# Patient Record
Sex: MALE | Race: WHITE
[De-identification: names, ages, dates, MRNs, and addresses within clinical notes are randomized per-mention and may not be internally consistent; named-entity substitution may affect disease eponyms.]

---
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
## 2019-06-26 ENCOUNTER — HOSPITAL ENCOUNTER (INPATIENT)
Dept: HOSPITAL 100 - ED | Age: 19
LOS: 3 days | Discharge: HOME | DRG: 638 | End: 2019-06-29
Payer: SELF-PAY

## 2019-06-26 VITALS — HEART RATE: 125 BPM

## 2019-06-26 VITALS
OXYGEN SATURATION: 100 % | DIASTOLIC BLOOD PRESSURE: 65 MMHG | HEART RATE: 129 BPM | SYSTOLIC BLOOD PRESSURE: 150 MMHG | RESPIRATION RATE: 30 BRPM

## 2019-06-26 VITALS
HEART RATE: 130 BPM | SYSTOLIC BLOOD PRESSURE: 167 MMHG | OXYGEN SATURATION: 100 % | RESPIRATION RATE: 23 BRPM | DIASTOLIC BLOOD PRESSURE: 87 MMHG

## 2019-06-26 VITALS
HEART RATE: 129 BPM | SYSTOLIC BLOOD PRESSURE: 156 MMHG | RESPIRATION RATE: 22 BRPM | DIASTOLIC BLOOD PRESSURE: 83 MMHG | OXYGEN SATURATION: 100 %

## 2019-06-26 VITALS
SYSTOLIC BLOOD PRESSURE: 157 MMHG | HEART RATE: 129 BPM | DIASTOLIC BLOOD PRESSURE: 74 MMHG | RESPIRATION RATE: 22 BRPM | OXYGEN SATURATION: 100 % | TEMPERATURE: 97.4 F

## 2019-06-26 VITALS
SYSTOLIC BLOOD PRESSURE: 138 MMHG | OXYGEN SATURATION: 99 % | DIASTOLIC BLOOD PRESSURE: 67 MMHG | HEART RATE: 130 BPM | RESPIRATION RATE: 30 BRPM

## 2019-06-26 VITALS
RESPIRATION RATE: 26 BRPM | OXYGEN SATURATION: 100 % | SYSTOLIC BLOOD PRESSURE: 159 MMHG | DIASTOLIC BLOOD PRESSURE: 83 MMHG | HEART RATE: 131 BPM

## 2019-06-26 VITALS
RESPIRATION RATE: 20 BRPM | OXYGEN SATURATION: 98 % | TEMPERATURE: 97.6 F | HEART RATE: 140 BPM | DIASTOLIC BLOOD PRESSURE: 83 MMHG | SYSTOLIC BLOOD PRESSURE: 144 MMHG

## 2019-06-26 VITALS — BODY MASS INDEX: 24.6 KG/M2 | BODY MASS INDEX: 24 KG/M2

## 2019-06-26 DIAGNOSIS — Z79.4: ICD-10-CM

## 2019-06-26 DIAGNOSIS — E87.5: ICD-10-CM

## 2019-06-26 DIAGNOSIS — E87.6: ICD-10-CM

## 2019-06-26 DIAGNOSIS — E87.1: ICD-10-CM

## 2019-06-26 DIAGNOSIS — E03.9: ICD-10-CM

## 2019-06-26 DIAGNOSIS — N17.9: ICD-10-CM

## 2019-06-26 DIAGNOSIS — E86.0: ICD-10-CM

## 2019-06-26 DIAGNOSIS — E10.10: Primary | ICD-10-CM

## 2019-06-26 LAB
ANION GAP: 24 (ref 5–15)
ANION GAP: 25 (ref 5–15)
BASE EXCESS BLDV CALC-SCNC: -21 MMOL/L (ref -1–3.5)
BUN SERPL-MCNC: 13 MG/DL (ref 7–18)
BUN SERPL-MCNC: 15 MG/DL (ref 7–18)
BUN/CREAT RATIO: 10.1 RATIO (ref 10–20)
BUN/CREAT RATIO: 10.3 RATIO (ref 10–20)
CALCIUM SERPL-MCNC: 8.1 MG/DL (ref 8.5–10.1)
CALCIUM SERPL-MCNC: 9.5 MG/DL (ref 8.5–10.1)
CARBON DIOXIDE: 7 MMOL/L (ref 21–32)
CARBON DIOXIDE: 9 MMOL/L (ref 21–32)
CHLORIDE: 106 MMOL/L (ref 98–107)
CHLORIDE: 99 MMOL/L (ref 98–107)
CO2 BLDA-SCNC: 8 MMOL/L (ref 23–33)
DEPRECATED RDW RBC: 42.7 FL (ref 35.1–43.9)
DIFFERENTIAL INDICATED: (no result)
ERYTHROCYTE [DISTWIDTH] IN BLOOD: 13.8 % (ref 11.6–14.6)
EST GLOM FILT RATE - AFR AMER: 80 ML/MIN (ref 60–?)
EST GLOM FILT RATE - AFR AMER: 91 ML/MIN (ref 60–?)
ESTIMATED CREATININE CLEARANCE: 112.14 ML/MIN
ESTIMATED CREATININE CLEARANCE: 99.77 ML/MIN
GLUCOSE, DIPSTICK: 1000 MG/DL
GLUCOSE: 514 MG/DL (ref 74–106)
GLUCOSE: 607 MG/DL (ref 74–106)
HCT VFR BLD AUTO: 45.2 % (ref 40–54)
HEMOGLOBIN: 16 G/DL (ref 13–16.5)
HGB BLD-MCNC: 16 G/DL (ref 13–16.5)
IMMATURE GRANULOCYTES COUNT: 0.12 X10^3/UL (ref 0–0)
KETONE-DIPSTICK: 150 MG/DL
MAGNESIUM: 1.9 MG/DL (ref 1.6–2.6)
MCV RBC: 86.3 FL (ref 80–94)
MEAN CORP HGB CONC: 35.4 G/GL (ref 32–36)
MEAN PLATELET VOL.: 10.2 FL (ref 6.2–12)
MUCOUS THREADS URNS QL MICRO: (no result) /HPF
PLATELET # BLD: 299 K/MM3 (ref 150–450)
PLATELET COUNT: 299 K/MM3 (ref 150–450)
PO2 BLDV: 60 MMHG (ref 25–40)
POSITIVE COUNT: NO
POSITIVE DIFFERENTIAL: NO
POSITIVE MORPHOLOGY: NO
POTASSIUM: 5.2 MMOL/L (ref 3.5–5.1)
POTASSIUM: 5.5 MMOL/L (ref 3.5–5.1)
PROT UR QL STRIP.AUTO: 15 MG/DL
RBC # BLD AUTO: 5.24 M/MM3 (ref 4.6–6.2)
RBC DISTRIBUTION WIDTH CV: 13.8 % (ref 11.6–14.6)
RBC DISTRIBUTION WIDTH SD: 42.7 FL (ref 35.1–43.9)
RBC UR QL: (no result) /HPF (ref 0–5)
RESULTS TO: (no result)
SITE: (no result)
SP GR UR: 1.02 (ref 1–1.03)
SQUAMOUS URNS QL MICRO: (no result) /HPF (ref 0–5)
URINE PRESERVATIVE: (no result)
VBG SO2: 83 % (ref 50–70)
WBC # BLD AUTO: 11.4 K/MM3 (ref 4.4–11)
WHITE BLOOD COUNT: 11.4 K/MM3 (ref 4.4–11)

## 2019-06-26 PROCEDURE — 85025 COMPLETE CBC W/AUTO DIFF WBC: CPT

## 2019-06-26 PROCEDURE — 82962 GLUCOSE BLOOD TEST: CPT

## 2019-06-26 PROCEDURE — 97802 MEDICAL NUTRITION INDIV IN: CPT

## 2019-06-26 PROCEDURE — 71045 X-RAY EXAM CHEST 1 VIEW: CPT

## 2019-06-26 PROCEDURE — A4216 STERILE WATER/SALINE, 10 ML: HCPCS

## 2019-06-26 PROCEDURE — 81001 URINALYSIS AUTO W/SCOPE: CPT

## 2019-06-26 PROCEDURE — 83735 ASSAY OF MAGNESIUM: CPT

## 2019-06-26 PROCEDURE — 83036 HEMOGLOBIN GLYCOSYLATED A1C: CPT

## 2019-06-26 PROCEDURE — 36415 COLL VENOUS BLD VENIPUNCTURE: CPT

## 2019-06-26 PROCEDURE — 93005 ELECTROCARDIOGRAM TRACING: CPT

## 2019-06-26 PROCEDURE — 82803 BLOOD GASES ANY COMBINATION: CPT

## 2019-06-26 PROCEDURE — 80048 BASIC METABOLIC PNL TOTAL CA: CPT

## 2019-06-26 PROCEDURE — 99285 EMERGENCY DEPT VISIT HI MDM: CPT

## 2019-06-26 PROCEDURE — 82009 KETONE BODYS QUAL: CPT

## 2019-06-26 PROCEDURE — 36600 WITHDRAWAL OF ARTERIAL BLOOD: CPT

## 2019-06-26 RX ADMIN — SODIUM CHLORIDE 500 ML: 9 INJECTION, SOLUTION INTRAVENOUS at 21:14

## 2019-06-26 RX ADMIN — SODIUM CHLORIDE 250 ML: 9 INJECTION, SOLUTION INTRAVENOUS at 23:11

## 2019-06-26 RX ADMIN — SODIUM CHLORIDE 999 ML: 9 INJECTION, SOLUTION INTRAVENOUS at 20:02

## 2019-06-27 VITALS
SYSTOLIC BLOOD PRESSURE: 148 MMHG | DIASTOLIC BLOOD PRESSURE: 74 MMHG | OXYGEN SATURATION: 98 % | RESPIRATION RATE: 22 BRPM | HEART RATE: 105 BPM

## 2019-06-27 VITALS
DIASTOLIC BLOOD PRESSURE: 80 MMHG | SYSTOLIC BLOOD PRESSURE: 150 MMHG | HEART RATE: 128 BPM | OXYGEN SATURATION: 100 % | RESPIRATION RATE: 22 BRPM

## 2019-06-27 VITALS
HEART RATE: 101 BPM | DIASTOLIC BLOOD PRESSURE: 82 MMHG | TEMPERATURE: 98.3 F | SYSTOLIC BLOOD PRESSURE: 148 MMHG | RESPIRATION RATE: 18 BRPM | OXYGEN SATURATION: 100 %

## 2019-06-27 VITALS
RESPIRATION RATE: 21 BRPM | HEART RATE: 128 BPM | DIASTOLIC BLOOD PRESSURE: 64 MMHG | OXYGEN SATURATION: 99 % | SYSTOLIC BLOOD PRESSURE: 144 MMHG

## 2019-06-27 VITALS
HEART RATE: 114 BPM | SYSTOLIC BLOOD PRESSURE: 136 MMHG | OXYGEN SATURATION: 100 % | RESPIRATION RATE: 17 BRPM | DIASTOLIC BLOOD PRESSURE: 62 MMHG

## 2019-06-27 VITALS
RESPIRATION RATE: 21 BRPM | OXYGEN SATURATION: 100 % | DIASTOLIC BLOOD PRESSURE: 65 MMHG | HEART RATE: 123 BPM | SYSTOLIC BLOOD PRESSURE: 139 MMHG

## 2019-06-27 VITALS
HEART RATE: 111 BPM | SYSTOLIC BLOOD PRESSURE: 137 MMHG | OXYGEN SATURATION: 99 % | TEMPERATURE: 98.2 F | RESPIRATION RATE: 21 BRPM | DIASTOLIC BLOOD PRESSURE: 68 MMHG

## 2019-06-27 VITALS
HEART RATE: 94 BPM | SYSTOLIC BLOOD PRESSURE: 129 MMHG | OXYGEN SATURATION: 100 % | RESPIRATION RATE: 20 BRPM | DIASTOLIC BLOOD PRESSURE: 74 MMHG | TEMPERATURE: 98.42 F

## 2019-06-27 VITALS
SYSTOLIC BLOOD PRESSURE: 139 MMHG | DIASTOLIC BLOOD PRESSURE: 84 MMHG | OXYGEN SATURATION: 100 % | RESPIRATION RATE: 20 BRPM | HEART RATE: 93 BPM

## 2019-06-27 VITALS
OXYGEN SATURATION: 100 % | RESPIRATION RATE: 21 BRPM | HEART RATE: 94 BPM | DIASTOLIC BLOOD PRESSURE: 91 MMHG | SYSTOLIC BLOOD PRESSURE: 144 MMHG

## 2019-06-27 VITALS
SYSTOLIC BLOOD PRESSURE: 132 MMHG | DIASTOLIC BLOOD PRESSURE: 82 MMHG | OXYGEN SATURATION: 100 % | TEMPERATURE: 98.6 F | HEART RATE: 98 BPM | RESPIRATION RATE: 18 BRPM

## 2019-06-27 VITALS
SYSTOLIC BLOOD PRESSURE: 137 MMHG | DIASTOLIC BLOOD PRESSURE: 67 MMHG | OXYGEN SATURATION: 98 % | HEART RATE: 104 BPM | RESPIRATION RATE: 20 BRPM

## 2019-06-27 VITALS
DIASTOLIC BLOOD PRESSURE: 71 MMHG | SYSTOLIC BLOOD PRESSURE: 127 MMHG | RESPIRATION RATE: 18 BRPM | OXYGEN SATURATION: 100 % | HEART RATE: 115 BPM

## 2019-06-27 VITALS
RESPIRATION RATE: 21 BRPM | OXYGEN SATURATION: 99 % | DIASTOLIC BLOOD PRESSURE: 56 MMHG | HEART RATE: 120 BPM | SYSTOLIC BLOOD PRESSURE: 139 MMHG

## 2019-06-27 VITALS — HEART RATE: 105 BPM

## 2019-06-27 VITALS
DIASTOLIC BLOOD PRESSURE: 84 MMHG | HEART RATE: 95 BPM | OXYGEN SATURATION: 95 % | RESPIRATION RATE: 18 BRPM | SYSTOLIC BLOOD PRESSURE: 139 MMHG

## 2019-06-27 VITALS
RESPIRATION RATE: 12 BRPM | DIASTOLIC BLOOD PRESSURE: 73 MMHG | SYSTOLIC BLOOD PRESSURE: 140 MMHG | TEMPERATURE: 98.24 F | OXYGEN SATURATION: 100 % | HEART RATE: 105 BPM

## 2019-06-27 VITALS
DIASTOLIC BLOOD PRESSURE: 76 MMHG | OXYGEN SATURATION: 99 % | HEART RATE: 129 BPM | SYSTOLIC BLOOD PRESSURE: 159 MMHG | RESPIRATION RATE: 23 BRPM | TEMPERATURE: 97.88 F

## 2019-06-27 VITALS
RESPIRATION RATE: 16 BRPM | SYSTOLIC BLOOD PRESSURE: 137 MMHG | HEART RATE: 91 BPM | TEMPERATURE: 98.42 F | OXYGEN SATURATION: 97 % | DIASTOLIC BLOOD PRESSURE: 81 MMHG

## 2019-06-27 VITALS — HEART RATE: 104 BPM

## 2019-06-27 LAB
ANION GAP: 10 (ref 5–15)
ANION GAP: 18 (ref 5–15)
ANION GAP: 18 (ref 5–15)
ANION GAP: 7 (ref 5–15)
BUN SERPL-MCNC: 11 MG/DL (ref 7–18)
BUN SERPL-MCNC: 6 MG/DL (ref 7–18)
BUN SERPL-MCNC: 7 MG/DL (ref 7–18)
BUN SERPL-MCNC: 9 MG/DL (ref 7–18)
BUN/CREAT RATIO: 5.5 RATIO (ref 10–20)
BUN/CREAT RATIO: 6.2 RATIO (ref 10–20)
BUN/CREAT RATIO: 7.4 RATIO (ref 10–20)
BUN/CREAT RATIO: 9.9 RATIO (ref 10–20)
CALCIUM SERPL-MCNC: 7.6 MG/DL (ref 8.5–10.1)
CALCIUM SERPL-MCNC: 7.8 MG/DL (ref 8.5–10.1)
CALCIUM SERPL-MCNC: 7.9 MG/DL (ref 8.5–10.1)
CALCIUM SERPL-MCNC: 8.3 MG/DL (ref 8.5–10.1)
CARBON DIOXIDE: 13 MMOL/L (ref 21–32)
CARBON DIOXIDE: 18 MMOL/L (ref 21–32)
CARBON DIOXIDE: 22 MMOL/L (ref 21–32)
CARBON DIOXIDE: 9 MMOL/L (ref 21–32)
CHLORIDE: 112 MMOL/L (ref 98–107)
CHLORIDE: 113 MMOL/L (ref 98–107)
DEPRECATED RDW RBC: 42.9 FL (ref 35.1–43.9)
DIFFERENTIAL INDICATED: (no result)
ERYTHROCYTE [DISTWIDTH] IN BLOOD: 14 % (ref 11.6–14.6)
EST GLOM FILT RATE - AFR AMER: 108 ML/MIN (ref 60–?)
EST GLOM FILT RATE - AFR AMER: 109 ML/MIN (ref 60–?)
EST GLOM FILT RATE - AFR AMER: 110 ML/MIN (ref 60–?)
EST GLOM FILT RATE - AFR AMER: 97 ML/MIN (ref 60–?)
ESTIMATED CREATININE CLEARANCE: 118.58 ML/MIN
ESTIMATED CREATININE CLEARANCE: 129.17 ML/MIN
ESTIMATED CREATININE CLEARANCE: 130.33 ML/MIN
ESTIMATED CREATININE CLEARANCE: 131.52 ML/MIN
GLUCOSE: 124 MG/DL (ref 74–106)
GLUCOSE: 181 MG/DL (ref 74–106)
GLUCOSE: 202 MG/DL (ref 74–106)
GLUCOSE: 222 MG/DL (ref 74–106)
HCT VFR BLD AUTO: 39.3 % (ref 40–54)
HEMOGLOBIN: 13.7 G/DL (ref 13–16.5)
HGB BLD-MCNC: 13.7 G/DL (ref 13–16.5)
IMMATURE GRANULOCYTES COUNT: 0.2 X10^3/UL (ref 0–0)
MCV RBC: 86.9 FL (ref 80–94)
MEAN CORP HGB CONC: 34.9 G/GL (ref 32–36)
MEAN PLATELET VOL.: 9.4 FL (ref 6.2–12)
PLATELET # BLD: 219 K/MM3 (ref 150–450)
PLATELET COUNT: 219 K/MM3 (ref 150–450)
POSITIVE COUNT: NO
POSITIVE DIFFERENTIAL: NO
POSITIVE MORPHOLOGY: NO
POTASSIUM: 3.4 MMOL/L (ref 3.5–5.1)
POTASSIUM: 3.5 MMOL/L (ref 3.5–5.1)
POTASSIUM: 4 MMOL/L (ref 3.5–5.1)
POTASSIUM: 4.7 MMOL/L (ref 3.5–5.1)
RBC # BLD AUTO: 4.52 M/MM3 (ref 4.6–6.2)
RBC DISTRIBUTION WIDTH CV: 14 % (ref 11.6–14.6)
RBC DISTRIBUTION WIDTH SD: 42.9 FL (ref 35.1–43.9)
WBC # BLD AUTO: 12.2 K/MM3 (ref 4.4–11)
WHITE BLOOD COUNT: 12.2 K/MM3 (ref 4.4–11)

## 2019-06-27 RX ADMIN — DEXTROSE AND SODIUM CHLORIDE 150 ML: 5; 450 INJECTION, SOLUTION INTRAVENOUS at 07:30

## 2019-06-27 RX ADMIN — DEXTROSE AND SODIUM CHLORIDE 150 ML: 5; 450 INJECTION, SOLUTION INTRAVENOUS at 00:58

## 2019-06-27 RX ADMIN — DEXTROSE AND SODIUM CHLORIDE 150 ML: 5; 450 INJECTION, SOLUTION INTRAVENOUS at 16:23

## 2019-06-27 RX ADMIN — SODIUM CHLORIDE, PRESERVATIVE FREE 0 ML: 5 INJECTION INTRAVENOUS at 05:57

## 2019-06-28 VITALS
OXYGEN SATURATION: 100 % | SYSTOLIC BLOOD PRESSURE: 156 MMHG | RESPIRATION RATE: 16 BRPM | DIASTOLIC BLOOD PRESSURE: 95 MMHG | HEART RATE: 113 BPM

## 2019-06-28 VITALS
DIASTOLIC BLOOD PRESSURE: 73 MMHG | OXYGEN SATURATION: 100 % | HEART RATE: 110 BPM | SYSTOLIC BLOOD PRESSURE: 137 MMHG | RESPIRATION RATE: 19 BRPM

## 2019-06-28 VITALS
TEMPERATURE: 98.8 F | HEART RATE: 65 BPM | RESPIRATION RATE: 13 BRPM | OXYGEN SATURATION: 94 % | DIASTOLIC BLOOD PRESSURE: 49 MMHG | SYSTOLIC BLOOD PRESSURE: 99 MMHG

## 2019-06-28 VITALS
RESPIRATION RATE: 21 BRPM | HEART RATE: 104 BPM | SYSTOLIC BLOOD PRESSURE: 151 MMHG | OXYGEN SATURATION: 100 % | DIASTOLIC BLOOD PRESSURE: 92 MMHG

## 2019-06-28 VITALS
RESPIRATION RATE: 16 BRPM | OXYGEN SATURATION: 100 % | DIASTOLIC BLOOD PRESSURE: 91 MMHG | HEART RATE: 99 BPM | SYSTOLIC BLOOD PRESSURE: 153 MMHG

## 2019-06-28 VITALS
OXYGEN SATURATION: 100 % | DIASTOLIC BLOOD PRESSURE: 93 MMHG | SYSTOLIC BLOOD PRESSURE: 152 MMHG | RESPIRATION RATE: 21 BRPM | HEART RATE: 109 BPM

## 2019-06-28 VITALS — HEART RATE: 135 BPM

## 2019-06-28 VITALS — HEART RATE: 146 BPM

## 2019-06-28 VITALS — DIASTOLIC BLOOD PRESSURE: 81 MMHG | SYSTOLIC BLOOD PRESSURE: 149 MMHG | HEART RATE: 114 BPM | RESPIRATION RATE: 21 BRPM

## 2019-06-28 VITALS
DIASTOLIC BLOOD PRESSURE: 79 MMHG | RESPIRATION RATE: 22 BRPM | OXYGEN SATURATION: 100 % | SYSTOLIC BLOOD PRESSURE: 141 MMHG | HEART RATE: 133 BPM

## 2019-06-28 VITALS
OXYGEN SATURATION: 100 % | DIASTOLIC BLOOD PRESSURE: 85 MMHG | SYSTOLIC BLOOD PRESSURE: 151 MMHG | HEART RATE: 110 BPM | RESPIRATION RATE: 24 BRPM | TEMPERATURE: 98.96 F

## 2019-06-28 VITALS — OXYGEN SATURATION: 100 % | HEART RATE: 148 BPM | RESPIRATION RATE: 27 BRPM

## 2019-06-28 VITALS
OXYGEN SATURATION: 100 % | SYSTOLIC BLOOD PRESSURE: 155 MMHG | HEART RATE: 123 BPM | DIASTOLIC BLOOD PRESSURE: 85 MMHG | RESPIRATION RATE: 18 BRPM

## 2019-06-28 VITALS
RESPIRATION RATE: 21 BRPM | DIASTOLIC BLOOD PRESSURE: 77 MMHG | SYSTOLIC BLOOD PRESSURE: 140 MMHG | OXYGEN SATURATION: 100 % | HEART RATE: 119 BPM

## 2019-06-28 VITALS
DIASTOLIC BLOOD PRESSURE: 89 MMHG | SYSTOLIC BLOOD PRESSURE: 151 MMHG | OXYGEN SATURATION: 100 % | RESPIRATION RATE: 25 BRPM | HEART RATE: 132 BPM

## 2019-06-28 VITALS
OXYGEN SATURATION: 100 % | RESPIRATION RATE: 27 BRPM | SYSTOLIC BLOOD PRESSURE: 155 MMHG | DIASTOLIC BLOOD PRESSURE: 79 MMHG | HEART RATE: 136 BPM

## 2019-06-28 VITALS
DIASTOLIC BLOOD PRESSURE: 80 MMHG | HEART RATE: 155 BPM | OXYGEN SATURATION: 100 % | SYSTOLIC BLOOD PRESSURE: 180 MMHG | RESPIRATION RATE: 23 BRPM

## 2019-06-28 VITALS — HEART RATE: 138 BPM

## 2019-06-28 VITALS
OXYGEN SATURATION: 100 % | RESPIRATION RATE: 31 BRPM | DIASTOLIC BLOOD PRESSURE: 54 MMHG | SYSTOLIC BLOOD PRESSURE: 156 MMHG | HEART RATE: 148 BPM

## 2019-06-28 VITALS
HEART RATE: 130 BPM | OXYGEN SATURATION: 100 % | SYSTOLIC BLOOD PRESSURE: 167 MMHG | DIASTOLIC BLOOD PRESSURE: 88 MMHG | RESPIRATION RATE: 23 BRPM

## 2019-06-28 VITALS
DIASTOLIC BLOOD PRESSURE: 72 MMHG | RESPIRATION RATE: 20 BRPM | OXYGEN SATURATION: 100 % | HEART RATE: 128 BPM | TEMPERATURE: 98.24 F | SYSTOLIC BLOOD PRESSURE: 138 MMHG

## 2019-06-28 VITALS — HEART RATE: 102 BPM

## 2019-06-28 VITALS
SYSTOLIC BLOOD PRESSURE: 157 MMHG | HEART RATE: 125 BPM | OXYGEN SATURATION: 100 % | RESPIRATION RATE: 25 BRPM | DIASTOLIC BLOOD PRESSURE: 90 MMHG

## 2019-06-28 LAB
ANION GAP: 11 (ref 5–15)
ANION GAP: 14 (ref 5–15)
ANION GAP: 19 (ref 5–15)
ANION GAP: 24 (ref 5–15)
ANION GAP: 26 (ref 5–15)
BASE EXCESS BLDV CALC-SCNC: -25 MMOL/L (ref -1–3.5)
BUN SERPL-MCNC: 10 MG/DL (ref 7–18)
BUN SERPL-MCNC: 6 MG/DL (ref 7–18)
BUN SERPL-MCNC: 7 MG/DL (ref 7–18)
BUN SERPL-MCNC: 8 MG/DL (ref 7–18)
BUN SERPL-MCNC: 9 MG/DL (ref 7–18)
BUN/CREAT RATIO: 5.3 RATIO (ref 10–20)
BUN/CREAT RATIO: 6.1 RATIO (ref 10–20)
BUN/CREAT RATIO: 6.2 RATIO (ref 10–20)
BUN/CREAT RATIO: 7.1 RATIO (ref 10–20)
BUN/CREAT RATIO: 7.3 RATIO (ref 10–20)
CALCIUM SERPL-MCNC: 7.9 MG/DL (ref 8.5–10.1)
CALCIUM SERPL-MCNC: 7.9 MG/DL (ref 8.5–10.1)
CALCIUM SERPL-MCNC: 8.3 MG/DL (ref 8.5–10.1)
CALCIUM SERPL-MCNC: 8.7 MG/DL (ref 8.5–10.1)
CALCIUM SERPL-MCNC: 9.1 MG/DL (ref 8.5–10.1)
CARBON DIOXIDE: 11 MMOL/L (ref 21–32)
CARBON DIOXIDE: 16 MMOL/L (ref 21–32)
CARBON DIOXIDE: 6 MMOL/L (ref 21–32)
CARBON DIOXIDE: 6 MMOL/L (ref 21–32)
CARBON DIOXIDE: 8 MMOL/L (ref 21–32)
CHLORIDE: 104 MMOL/L (ref 98–107)
CHLORIDE: 107 MMOL/L (ref 98–107)
CHLORIDE: 116 MMOL/L (ref 98–107)
CHLORIDE: 117 MMOL/L (ref 98–107)
CHLORIDE: 118 MMOL/L (ref 98–107)
CO2 BLDA-SCNC: 6 MMOL/L (ref 23–33)
EST GLOM FILT RATE - AFR AMER: 104 ML/MIN (ref 60–?)
EST GLOM FILT RATE - AFR AMER: 105 ML/MIN (ref 60–?)
EST GLOM FILT RATE - AFR AMER: 85 ML/MIN (ref 60–?)
EST GLOM FILT RATE - AFR AMER: 91 ML/MIN (ref 60–?)
EST GLOM FILT RATE - AFR AMER: 94 ML/MIN (ref 60–?)
ESTIMATED CREATININE CLEARANCE: 105.6 ML/MIN
ESTIMATED CREATININE CLEARANCE: 112.14 ML/MIN
ESTIMATED CREATININE CLEARANCE: 114.81 ML/MIN
GLUCOSE: 151 MG/DL (ref 74–106)
GLUCOSE: 180 MG/DL (ref 74–106)
GLUCOSE: 275 MG/DL (ref 74–106)
GLUCOSE: 444 MG/DL (ref 74–106)
GLUCOSE: 553 MG/DL (ref 74–106)
PO2 BLDV: 194 MMHG (ref 25–40)
POTASSIUM: 3.7 MMOL/L (ref 3.5–5.1)
POTASSIUM: 4.1 MMOL/L (ref 3.5–5.1)
POTASSIUM: 4.5 MMOL/L (ref 3.5–5.1)
POTASSIUM: 5 MMOL/L (ref 3.5–5.1)
POTASSIUM: 5 MMOL/L (ref 3.5–5.1)
TIME GIVEN: 1130
VBG SO2: 99 % (ref 50–70)

## 2019-06-28 RX ADMIN — PROMETHAZINE HYDROCHLORIDE 6.25 MG: 25 INJECTION INTRAMUSCULAR; INTRAVENOUS at 05:00

## 2019-06-28 RX ADMIN — DEXTROSE AND SODIUM CHLORIDE 150 ML: 5; 450 INJECTION, SOLUTION INTRAVENOUS at 18:30

## 2019-06-28 RX ADMIN — SODIUM CHLORIDE, PRESERVATIVE FREE 0 ML: 5 INJECTION INTRAVENOUS at 19:42

## 2019-06-28 RX ADMIN — DEXTROSE AND SODIUM CHLORIDE 150 ML: 5; 450 INJECTION, SOLUTION INTRAVENOUS at 12:13

## 2019-06-28 RX ADMIN — SODIUM CHLORIDE 999 ML: 9 INJECTION, SOLUTION INTRAVENOUS at 11:12

## 2019-06-28 RX ADMIN — SODIUM CHLORIDE 999 ML: 9 INJECTION, SOLUTION INTRAVENOUS at 08:58

## 2019-06-28 RX ADMIN — SODIUM CHLORIDE 999 ML: 9 INJECTION, SOLUTION INTRAVENOUS at 07:44

## 2019-06-29 VITALS
RESPIRATION RATE: 21 BRPM | SYSTOLIC BLOOD PRESSURE: 151 MMHG | OXYGEN SATURATION: 100 % | DIASTOLIC BLOOD PRESSURE: 95 MMHG | HEART RATE: 95 BPM

## 2019-06-29 VITALS
SYSTOLIC BLOOD PRESSURE: 138 MMHG | HEART RATE: 91 BPM | RESPIRATION RATE: 20 BRPM | DIASTOLIC BLOOD PRESSURE: 83 MMHG | OXYGEN SATURATION: 98 %

## 2019-06-29 VITALS
SYSTOLIC BLOOD PRESSURE: 140 MMHG | DIASTOLIC BLOOD PRESSURE: 79 MMHG | OXYGEN SATURATION: 98 % | HEART RATE: 108 BPM | RESPIRATION RATE: 18 BRPM

## 2019-06-29 VITALS
SYSTOLIC BLOOD PRESSURE: 154 MMHG | RESPIRATION RATE: 14 BRPM | DIASTOLIC BLOOD PRESSURE: 95 MMHG | TEMPERATURE: 98.1 F | OXYGEN SATURATION: 100 % | HEART RATE: 109 BPM

## 2019-06-29 VITALS
DIASTOLIC BLOOD PRESSURE: 91 MMHG | OXYGEN SATURATION: 99 % | RESPIRATION RATE: 17 BRPM | HEART RATE: 105 BPM | SYSTOLIC BLOOD PRESSURE: 143 MMHG | TEMPERATURE: 98.8 F

## 2019-06-29 VITALS — HEART RATE: 94 BPM

## 2019-06-29 VITALS
OXYGEN SATURATION: 99 % | TEMPERATURE: 97.8 F | SYSTOLIC BLOOD PRESSURE: 134 MMHG | HEART RATE: 92 BPM | RESPIRATION RATE: 20 BRPM | DIASTOLIC BLOOD PRESSURE: 81 MMHG

## 2019-06-29 VITALS
SYSTOLIC BLOOD PRESSURE: 117 MMHG | RESPIRATION RATE: 18 BRPM | OXYGEN SATURATION: 99 % | TEMPERATURE: 97.88 F | HEART RATE: 92 BPM | DIASTOLIC BLOOD PRESSURE: 67 MMHG

## 2019-06-29 VITALS
SYSTOLIC BLOOD PRESSURE: 134 MMHG | HEART RATE: 92 BPM | RESPIRATION RATE: 20 BRPM | OXYGEN SATURATION: 99 % | DIASTOLIC BLOOD PRESSURE: 81 MMHG

## 2019-06-29 VITALS
DIASTOLIC BLOOD PRESSURE: 82 MMHG | RESPIRATION RATE: 23 BRPM | OXYGEN SATURATION: 97 % | HEART RATE: 97 BPM | SYSTOLIC BLOOD PRESSURE: 143 MMHG

## 2019-06-29 VITALS
RESPIRATION RATE: 21 BRPM | DIASTOLIC BLOOD PRESSURE: 77 MMHG | OXYGEN SATURATION: 98 % | HEART RATE: 95 BPM | SYSTOLIC BLOOD PRESSURE: 153 MMHG

## 2019-06-29 LAB
ANION GAP: 12 (ref 5–15)
BUN SERPL-MCNC: 8 MG/DL (ref 7–18)
BUN/CREAT RATIO: 7.7 RATIO (ref 10–20)
CALCIUM SERPL-MCNC: 8.6 MG/DL (ref 8.5–10.1)
CARBON DIOXIDE: 20 MMOL/L (ref 21–32)
CHLORIDE: 112 MMOL/L (ref 98–107)
EST GLOM FILT RATE - AFR AMER: 117 ML/MIN (ref 60–?)
GLUCOSE: 211 MG/DL (ref 74–106)
POTASSIUM: 3.4 MMOL/L (ref 3.5–5.1)

## 2020-03-25 ENCOUNTER — HOSPITAL ENCOUNTER (OUTPATIENT)
Age: 20
End: 2020-03-25
Payer: COMMERCIAL

## 2020-03-25 VITALS — BODY MASS INDEX: 29.1 KG/M2

## 2020-03-25 DIAGNOSIS — E06.3: ICD-10-CM

## 2020-03-25 DIAGNOSIS — E03.8: ICD-10-CM

## 2020-03-25 DIAGNOSIS — E10.9: Primary | ICD-10-CM

## 2020-03-25 LAB
ALANINE AMINOTRANSFER ALT/SGPT: 30 U/L (ref 16–61)
ALBUMIN SERPL-MCNC: 4.5 G/DL (ref 3.2–5)
ALKALINE PHOSPHATASE: 87 U/L (ref 45–117)
ANION GAP: 8 (ref 5–15)
AST(SGOT): 17 U/L (ref 15–37)
BUN SERPL-MCNC: 17 MG/DL (ref 7–18)
BUN/CREAT RATIO: 21.8 RATIO (ref 10–20)
CALCIUM SERPL-MCNC: 9.5 MG/DL (ref 8.5–10.1)
CARBON DIOXIDE: 28 MMOL/L (ref 21–32)
CHLORIDE: 103 MMOL/L (ref 98–107)
CHOLEST SERPL-MCNC: 153 MG/DL
CREAT UR-MCNC: 75.3 MG/DL
EST GLOM FILT RATE - AFR AMER: 162 ML/MIN (ref 60–?)
GLOBULIN: 4.1 G/DL (ref 2.2–4.2)
GLUCOSE: 59 MG/DL (ref 74–106)
MICROALBUMIN UR-MCNC: 87.1 MG/L
POTASSIUM: 4.3 MMOL/L (ref 3.5–5.1)
TRIGLYCERIDES: 54 MG/DL
VLDLC SERPL-MCNC: 11 MG/DL (ref 5–40)

## 2020-03-25 PROCEDURE — 80053 COMPREHEN METABOLIC PANEL: CPT

## 2020-03-25 PROCEDURE — 80061 LIPID PANEL: CPT

## 2020-03-25 PROCEDURE — 84443 ASSAY THYROID STIM HORMONE: CPT

## 2020-03-25 PROCEDURE — 82570 ASSAY OF URINE CREATININE: CPT

## 2020-03-25 PROCEDURE — 82043 UR ALBUMIN QUANTITATIVE: CPT

## 2020-03-25 PROCEDURE — 36415 COLL VENOUS BLD VENIPUNCTURE: CPT

## 2020-04-08 ENCOUNTER — HOSPITAL ENCOUNTER (EMERGENCY)
Age: 20
Discharge: HOME | End: 2020-04-08
Payer: MEDICAID

## 2020-04-08 VITALS
RESPIRATION RATE: 17 BRPM | SYSTOLIC BLOOD PRESSURE: 155 MMHG | DIASTOLIC BLOOD PRESSURE: 86 MMHG | TEMPERATURE: 100.58 F | OXYGEN SATURATION: 99 % | HEART RATE: 117 BPM

## 2020-04-08 VITALS
RESPIRATION RATE: 15 BRPM | DIASTOLIC BLOOD PRESSURE: 71 MMHG | OXYGEN SATURATION: 99 % | HEART RATE: 131 BPM | SYSTOLIC BLOOD PRESSURE: 126 MMHG | TEMPERATURE: 99.8 F

## 2020-04-08 VITALS — BODY MASS INDEX: 24 KG/M2 | BODY MASS INDEX: 29 KG/M2

## 2020-04-08 VITALS
RESPIRATION RATE: 24 BRPM | SYSTOLIC BLOOD PRESSURE: 157 MMHG | OXYGEN SATURATION: 95 % | DIASTOLIC BLOOD PRESSURE: 89 MMHG | TEMPERATURE: 102.02 F | HEART RATE: 123 BPM

## 2020-04-08 DIAGNOSIS — Z79.4: ICD-10-CM

## 2020-04-08 DIAGNOSIS — J06.9: Primary | ICD-10-CM

## 2020-04-08 DIAGNOSIS — E10.9: ICD-10-CM

## 2020-04-08 DIAGNOSIS — E06.3: ICD-10-CM

## 2020-04-08 PROCEDURE — 96372 THER/PROPH/DIAG INJ SC/IM: CPT

## 2020-04-08 PROCEDURE — 87807 RSV ASSAY W/OPTIC: CPT

## 2020-04-08 PROCEDURE — 99284 EMERGENCY DEPT VISIT MOD MDM: CPT

## 2020-04-08 PROCEDURE — 87804 INFLUENZA ASSAY W/OPTIC: CPT

## 2020-04-08 PROCEDURE — 71045 X-RAY EXAM CHEST 1 VIEW: CPT

## 2021-09-23 ENCOUNTER — HOSPITAL ENCOUNTER (OUTPATIENT)
Age: 21
End: 2021-09-23
Payer: MEDICAID

## 2021-09-23 DIAGNOSIS — E10.9: Primary | ICD-10-CM

## 2021-09-23 DIAGNOSIS — E06.3: ICD-10-CM

## 2021-09-23 DIAGNOSIS — E03.8: ICD-10-CM

## 2021-09-23 LAB
ALANINE AMINOTRANSFER ALT/SGPT: 33 U/L (ref 16–61)
ALBUMIN SERPL-MCNC: 3.7 G/DL (ref 3.2–5)
ALKALINE PHOSPHATASE: 89 U/L (ref 45–117)
ANION GAP: 5 (ref 5–15)
AST(SGOT): 16 U/L (ref 15–37)
BUN SERPL-MCNC: 21 MG/DL (ref 7–18)
BUN/CREAT RATIO: 24.9 RATIO (ref 10–20)
CALCIUM SERPL-MCNC: 8.9 MG/DL (ref 8.5–10.1)
CARBON DIOXIDE: 27 MMOL/L (ref 21–32)
CHLORIDE: 107 MMOL/L (ref 98–107)
CHOLEST SERPL-MCNC: 167 MG/DL
CREAT UR-MCNC: 59.8 MG/DL
EST GLOM FILT RATE - AFR AMER: 146 ML/MIN (ref 60–?)
GLOBULIN: 3.4 G/DL (ref 2.2–4.2)
GLUCOSE: 303 MG/DL (ref 74–106)
MICROALBUMIN UR-MCNC: 12.1 MG/L
POTASSIUM: 4.3 MMOL/L (ref 3.5–5.1)
TRIGLYCERIDES: 83 MG/DL
VLDLC SERPL-MCNC: 17 MG/DL (ref 5–40)

## 2021-09-23 PROCEDURE — 80061 LIPID PANEL: CPT

## 2021-09-23 PROCEDURE — 80053 COMPREHEN METABOLIC PANEL: CPT

## 2021-09-23 PROCEDURE — 36415 COLL VENOUS BLD VENIPUNCTURE: CPT

## 2021-09-23 PROCEDURE — 84439 ASSAY OF FREE THYROXINE: CPT

## 2021-09-23 PROCEDURE — 82570 ASSAY OF URINE CREATININE: CPT

## 2021-09-23 PROCEDURE — 84443 ASSAY THYROID STIM HORMONE: CPT

## 2021-09-23 PROCEDURE — 82043 UR ALBUMIN QUANTITATIVE: CPT

## 2022-10-26 ENCOUNTER — HOSPITAL ENCOUNTER (OUTPATIENT)
Dept: HOSPITAL 100 - LABSPEC | Age: 22
Discharge: HOME | End: 2022-10-26
Payer: COMMERCIAL

## 2022-10-26 DIAGNOSIS — E06.3: ICD-10-CM

## 2022-10-26 DIAGNOSIS — E03.8: ICD-10-CM

## 2022-10-26 DIAGNOSIS — E10.9: Primary | ICD-10-CM

## 2022-10-26 LAB
ALANINE AMINOTRANSFER ALT/SGPT: 55 U/L (ref 16–61)
ALBUMIN SERPL-MCNC: 3.5 G/DL (ref 3.2–5)
ALKALINE PHOSPHATASE: 95 U/L (ref 45–117)
ANION GAP: 4 (ref 5–15)
AST(SGOT): 36 U/L (ref 15–37)
BUN SERPL-MCNC: 21 MG/DL (ref 7–18)
BUN/CREAT RATIO: 20.8 RATIO (ref 10–20)
CALCIUM SERPL-MCNC: 8.6 MG/DL (ref 8.5–10.1)
CARBON DIOXIDE: 28 MMOL/L (ref 21–32)
CHLORIDE: 104 MMOL/L (ref 98–107)
CHOLEST SERPL-MCNC: 139 MG/DL
CREAT UR-MCNC: 41 MG/DL
EST GLOM FILT RATE - AFR AMER: 117 ML/MIN (ref 60–?)
GLOBULIN: 3.9 G/DL (ref 2.2–4.2)
GLUCOSE: 355 MG/DL (ref 74–106)
MICROALBUMIN UR-MCNC: 7.8 MG/L
POTASSIUM: 4 MMOL/L (ref 3.5–5.1)
TRIGLYCERIDES: 108 MG/DL
VLDLC SERPL-MCNC: 22 MG/DL (ref 5–40)

## 2022-10-26 PROCEDURE — 84439 ASSAY OF FREE THYROXINE: CPT

## 2022-10-26 PROCEDURE — 80061 LIPID PANEL: CPT

## 2022-10-26 PROCEDURE — 84443 ASSAY THYROID STIM HORMONE: CPT

## 2022-10-26 PROCEDURE — 80053 COMPREHEN METABOLIC PANEL: CPT

## 2022-10-26 PROCEDURE — 36415 COLL VENOUS BLD VENIPUNCTURE: CPT

## 2022-10-26 PROCEDURE — 82043 UR ALBUMIN QUANTITATIVE: CPT

## 2022-10-26 PROCEDURE — 82570 ASSAY OF URINE CREATININE: CPT

## 2023-10-20 ENCOUNTER — DOCUMENTATION (OUTPATIENT)
Dept: PHYSICAL THERAPY | Facility: CLINIC | Age: 23
End: 2023-10-20
Payer: COMMERCIAL

## 2023-10-20 NOTE — PROGRESS NOTES
Physical Therapy    Discharge Summary    Name: Buzz Chatman  MRN: [id number]  : 2000  Date: 10/20/23    Discharge Summary: PT       Has been seen in clinical physical therapy on   to 23 for 4 visit (s); there has been no further visits attended. DC from PT

## 2023-12-25 ENCOUNTER — HOSPITAL ENCOUNTER (EMERGENCY)
Age: 23
Discharge: HOME | End: 2023-12-25
Payer: COMMERCIAL

## 2023-12-25 VITALS
RESPIRATION RATE: 16 BRPM | OXYGEN SATURATION: 98 % | HEART RATE: 90 BPM | DIASTOLIC BLOOD PRESSURE: 71 MMHG | SYSTOLIC BLOOD PRESSURE: 127 MMHG

## 2023-12-25 VITALS
RESPIRATION RATE: 19 BRPM | TEMPERATURE: 98.42 F | SYSTOLIC BLOOD PRESSURE: 129 MMHG | OXYGEN SATURATION: 97 % | DIASTOLIC BLOOD PRESSURE: 77 MMHG | HEART RATE: 89 BPM

## 2023-12-25 VITALS
HEART RATE: 93 BPM | BODY MASS INDEX: 31.6 KG/M2 | RESPIRATION RATE: 16 BRPM | DIASTOLIC BLOOD PRESSURE: 93 MMHG | TEMPERATURE: 96.44 F | OXYGEN SATURATION: 98 % | SYSTOLIC BLOOD PRESSURE: 149 MMHG

## 2023-12-25 VITALS — HEART RATE: 87 BPM | OXYGEN SATURATION: 97 % | RESPIRATION RATE: 19 BRPM

## 2023-12-25 DIAGNOSIS — E10.9: ICD-10-CM

## 2023-12-25 DIAGNOSIS — E06.3: ICD-10-CM

## 2023-12-25 DIAGNOSIS — E66.9: ICD-10-CM

## 2023-12-25 DIAGNOSIS — Z87.891: ICD-10-CM

## 2023-12-25 DIAGNOSIS — Z96.41: ICD-10-CM

## 2023-12-25 DIAGNOSIS — R10.84: Primary | ICD-10-CM

## 2023-12-25 DIAGNOSIS — R16.2: ICD-10-CM

## 2023-12-25 LAB
ALANINE AMINOTRANSFER ALT/SGPT: 28 U/L (ref 16–61)
ALBUMIN SERPL-MCNC: 3.9 G/DL (ref 3.2–5)
ALKALINE PHOSPHATASE: 79 U/L (ref 45–117)
ANION GAP: 4 (ref 5–15)
AST(SGOT): 13 U/L (ref 15–37)
BUN SERPL-MCNC: 20 MG/DL (ref 7–18)
BUN/CREAT RATIO: 22.8 RATIO (ref 10–20)
CALCIUM SERPL-MCNC: 9.2 MG/DL (ref 8.5–10.1)
CARBON DIOXIDE: 30 MMOL/L (ref 21–32)
CHLORIDE: 106 MMOL/L (ref 98–107)
DEPRECATED RDW RBC: 39.8 FL (ref 35.1–43.9)
ERYTHROCYTE [DISTWIDTH] IN BLOOD: 12.7 % (ref 11.6–14.6)
EST GLOM FILT RATE - AFR AMER: 137 ML/MIN (ref 60–?)
ESTIMATED CREATININE CLEARANCE: 158.91 ML/MIN
GLOBULIN: 3.8 G/DL (ref 2.2–4.2)
GLUCOSE: 146 MG/DL (ref 74–106)
HCT VFR BLD AUTO: 48.8 % (ref 40–54)
HEMOGLOBIN: 16.3 G/DL (ref 13–16.5)
HGB BLD-MCNC: 16.3 G/DL (ref 13–16.5)
IMMATURE GRANULOCYTES COUNT: 0.05 X10^3/UL (ref 0–0)
LIPASE: 12 U/L (ref 13–75)
MCV RBC: 86.8 FL (ref 80–94)
MEAN CORP HGB CONC: 33.4 G/DL (ref 32–36)
MEAN PLATELET VOL.: 9.4 FL (ref 6.2–12)
NRBC FLAGGED BY ANALYZER: 0 % (ref 0–5)
PLATELET # BLD: 213 K/MM3 (ref 150–450)
PLATELET COUNT: 213 K/MM3 (ref 150–450)
POTASSIUM: 3.9 MMOL/L (ref 3.5–5.1)
RBC # BLD AUTO: 5.62 M/MM3 (ref 4.6–6.2)
RBC DISTRIBUTION WIDTH CV: 12.7 % (ref 11.6–14.6)
RBC DISTRIBUTION WIDTH SD: 39.8 FL (ref 35.1–43.9)
WBC # BLD AUTO: 8 K/MM3 (ref 4.4–11)
WHITE BLOOD COUNT: 8 K/MM3 (ref 4.4–11)

## 2023-12-25 PROCEDURE — 74177 CT ABD & PELVIS W/CONTRAST: CPT

## 2023-12-25 PROCEDURE — 96375 TX/PRO/DX INJ NEW DRUG ADDON: CPT

## 2023-12-25 PROCEDURE — 80053 COMPREHEN METABOLIC PANEL: CPT

## 2023-12-25 PROCEDURE — 96365 THER/PROPH/DIAG IV INF INIT: CPT

## 2023-12-25 PROCEDURE — 85025 COMPLETE CBC W/AUTO DIFF WBC: CPT

## 2023-12-25 PROCEDURE — 83605 ASSAY OF LACTIC ACID: CPT

## 2023-12-25 PROCEDURE — 83690 ASSAY OF LIPASE: CPT

## 2023-12-25 PROCEDURE — 99284 EMERGENCY DEPT VISIT MOD MDM: CPT

## 2023-12-25 PROCEDURE — A4216 STERILE WATER/SALINE, 10 ML: HCPCS

## 2023-12-25 PROCEDURE — 87040 BLOOD CULTURE FOR BACTERIA: CPT

## 2024-01-04 ENCOUNTER — OFFICE VISIT (OUTPATIENT)
Dept: PRIMARY CARE | Facility: CLINIC | Age: 24
End: 2024-01-04
Payer: COMMERCIAL

## 2024-01-04 ENCOUNTER — LAB (OUTPATIENT)
Dept: LAB | Facility: LAB | Age: 24
End: 2024-01-04
Payer: COMMERCIAL

## 2024-01-04 VITALS
OXYGEN SATURATION: 96 % | WEIGHT: 261.3 LBS | SYSTOLIC BLOOD PRESSURE: 130 MMHG | BODY MASS INDEX: 31.82 KG/M2 | DIASTOLIC BLOOD PRESSURE: 70 MMHG | HEART RATE: 71 BPM

## 2024-01-04 DIAGNOSIS — R16.1 SPLENOMEGALY: ICD-10-CM

## 2024-01-04 DIAGNOSIS — J35.1 TONSILLAR HYPERTROPHY: ICD-10-CM

## 2024-01-04 DIAGNOSIS — R53.83 OTHER FATIGUE: ICD-10-CM

## 2024-01-04 DIAGNOSIS — R19.7 DIARRHEA, UNSPECIFIED TYPE: ICD-10-CM

## 2024-01-04 DIAGNOSIS — R16.0 HEPATOMEGALY: Primary | ICD-10-CM

## 2024-01-04 DIAGNOSIS — R16.0 HEPATOMEGALY: ICD-10-CM

## 2024-01-04 DIAGNOSIS — R06.83 SNORING: ICD-10-CM

## 2024-01-04 DIAGNOSIS — E01.0 THYROMEGALY: ICD-10-CM

## 2024-01-04 DIAGNOSIS — E10.69 TYPE 1 DIABETES MELLITUS WITH OTHER SPECIFIED COMPLICATION (MULTI): ICD-10-CM

## 2024-01-04 PROBLEM — E10.9 TYPE 1 DIABETES MELLITUS (MULTI): Status: ACTIVE | Noted: 2023-11-13

## 2024-01-04 PROBLEM — E11.10 DIABETIC KETOACIDOSIS (MULTI): Status: RESOLVED | Noted: 2024-01-04 | Resolved: 2024-01-04

## 2024-01-04 PROBLEM — Z96.41 PRESENCE OF INSULIN PUMP: Status: ACTIVE | Noted: 2024-01-04

## 2024-01-04 PROBLEM — S37.009A INJURY OF KIDNEY: Status: RESOLVED | Noted: 2024-01-04 | Resolved: 2024-01-04

## 2024-01-04 PROBLEM — Z91.199 GENERAL PATIENT NONCOMPLIANCE: Status: ACTIVE | Noted: 2024-01-04

## 2024-01-04 PROBLEM — S82.832A CLOSED FRACTURE OF LEFT DISTAL FIBULA: Status: RESOLVED | Noted: 2018-08-28 | Resolved: 2024-01-04

## 2024-01-04 PROBLEM — E66.9 OBESITY: Status: ACTIVE | Noted: 2024-01-04

## 2024-01-04 PROBLEM — F32.A DEPRESSIVE DISORDER: Status: ACTIVE | Noted: 2024-01-04

## 2024-01-04 LAB
ALT SERPL W P-5'-P-CCNC: 15 U/L (ref 10–52)
AST SERPL W P-5'-P-CCNC: 11 U/L (ref 9–39)
BASOPHILS # BLD AUTO: 0.03 X10*3/UL (ref 0–0.1)
BASOPHILS NFR BLD AUTO: 0.6 %
EOSINOPHIL # BLD AUTO: 0.14 X10*3/UL (ref 0–0.7)
EOSINOPHIL NFR BLD AUTO: 2.7 %
ERYTHROCYTE [DISTWIDTH] IN BLOOD BY AUTOMATED COUNT: 12.5 % (ref 11.5–14.5)
FERRITIN SERPL-MCNC: 277 NG/ML (ref 20–300)
GGT SERPL-CCNC: 12 U/L (ref 5–64)
HAV IGM SER QL: NONREACTIVE
HBV CORE IGM SER QL: NONREACTIVE
HBV SURFACE AG SERPL QL IA: NONREACTIVE
HCT VFR BLD AUTO: 45.6 % (ref 41–52)
HCV AB SER QL: NONREACTIVE
HGB BLD-MCNC: 14.8 G/DL (ref 13.5–17.5)
HIV 1+2 AB+HIV1 P24 AG SERPL QL IA: NONREACTIVE
IMM GRANULOCYTES # BLD AUTO: 0.03 X10*3/UL (ref 0–0.7)
IMM GRANULOCYTES NFR BLD AUTO: 0.6 % (ref 0–0.9)
LYMPHOCYTES # BLD AUTO: 1.92 X10*3/UL (ref 1.2–4.8)
LYMPHOCYTES NFR BLD AUTO: 37.4 %
MCH RBC QN AUTO: 28.8 PG (ref 26–34)
MCHC RBC AUTO-ENTMCNC: 32.5 G/DL (ref 32–36)
MCV RBC AUTO: 89 FL (ref 80–100)
MONOCYTES # BLD AUTO: 0.37 X10*3/UL (ref 0.1–1)
MONOCYTES NFR BLD AUTO: 7.2 %
NEUTROPHILS # BLD AUTO: 2.64 X10*3/UL (ref 1.2–7.7)
NEUTROPHILS NFR BLD AUTO: 51.5 %
NRBC BLD-RTO: 0 /100 WBCS (ref 0–0)
PLATELET # BLD AUTO: 230 X10*3/UL (ref 150–450)
RBC # BLD AUTO: 5.13 X10*6/UL (ref 4.5–5.9)
WBC # BLD AUTO: 5.1 X10*3/UL (ref 4.4–11.3)

## 2024-01-04 PROCEDURE — 80074 ACUTE HEPATITIS PANEL: CPT

## 2024-01-04 PROCEDURE — 82977 ASSAY OF GGT: CPT

## 2024-01-04 PROCEDURE — 36415 COLL VENOUS BLD VENIPUNCTURE: CPT

## 2024-01-04 PROCEDURE — 3078F DIAST BP <80 MM HG: CPT | Performed by: INTERNAL MEDICINE

## 2024-01-04 PROCEDURE — 84460 ALANINE AMINO (ALT) (SGPT): CPT

## 2024-01-04 PROCEDURE — 85025 COMPLETE CBC W/AUTO DIFF WBC: CPT

## 2024-01-04 PROCEDURE — 3075F SYST BP GE 130 - 139MM HG: CPT | Performed by: INTERNAL MEDICINE

## 2024-01-04 PROCEDURE — 87389 HIV-1 AG W/HIV-1&-2 AB AG IA: CPT

## 2024-01-04 PROCEDURE — 84450 TRANSFERASE (AST) (SGOT): CPT

## 2024-01-04 PROCEDURE — 99204 OFFICE O/P NEW MOD 45 MIN: CPT | Performed by: INTERNAL MEDICINE

## 2024-01-04 PROCEDURE — 82728 ASSAY OF FERRITIN: CPT

## 2024-01-04 RX ORDER — CHLORPHENIR/PHENYLEPH/ASPIRIN 2-7.8-325
1 TABLET, EFFERVESCENT ORAL AS NEEDED
COMMUNITY
Start: 2023-12-29

## 2024-01-04 RX ORDER — LAMOTRIGINE 100 MG/1
100 TABLET ORAL DAILY
COMMUNITY
Start: 2023-11-21

## 2024-01-04 RX ORDER — LEVOTHYROXINE SODIUM 75 UG/1
75 TABLET ORAL
COMMUNITY

## 2024-01-04 RX ORDER — INSULIN ASPART 100 [IU]/ML
INJECTION, SOLUTION INTRAVENOUS; SUBCUTANEOUS
COMMUNITY

## 2024-01-04 RX ORDER — ONDANSETRON HYDROCHLORIDE 8 MG/1
8 TABLET, FILM COATED ORAL EVERY 8 HOURS PRN
COMMUNITY
Start: 2022-12-12

## 2024-01-04 RX ORDER — ALBUTEROL SULFATE 90 UG/1
2 AEROSOL, METERED RESPIRATORY (INHALATION) EVERY 4 HOURS PRN
COMMUNITY
Start: 2022-01-11

## 2024-01-04 RX ORDER — VORTIOXETINE 10 MG/1
10 TABLET, FILM COATED ORAL
COMMUNITY
Start: 2023-12-11

## 2024-01-04 ASSESSMENT — ENCOUNTER SYMPTOMS
ARTHRALGIAS: 0
CHEST TIGHTNESS: 0
BLOOD IN STOOL: 0
VOMITING: 0
ABDOMINAL PAIN: 0
NAUSEA: 0
BACK PAIN: 0
SHORTNESS OF BREATH: 0
DIARRHEA: 1
PALPITATIONS: 0
FATIGUE: 1
WHEEZING: 0
COUGH: 0

## 2024-01-04 ASSESSMENT — PATIENT HEALTH QUESTIONNAIRE - PHQ9
2. FEELING DOWN, DEPRESSED OR HOPELESS: NEARLY EVERY DAY
SUM OF ALL RESPONSES TO PHQ9 QUESTIONS 1 AND 2: 6
1. LITTLE INTEREST OR PLEASURE IN DOING THINGS: NEARLY EVERY DAY

## 2024-01-04 NOTE — PATIENT INSTRUCTIONS
As we discussed I am ordering additional lab work to assess your described condition of having an enlarged liver and spleen so please go to the lab at your earliest convenience to get these done  I am also ordering an ultrasound of your thyroid gland because I detected that it seemed enlarged today  I am also having the staff order a test called a nocturnal pulse oximetry screen.  This is where you wear a device on your finger overnight while you sleep in the privacy of your own home and we will see if your oxygen levels are dropping  It is after completion of the studies that I would like to see you back  Please continue following closely with your endocrinologist  I would like to have an update on how you are doing with your sugars and your insulin pump.  I would like for you to keep track of your sugars more frequently and respond by increasing your insulin as necessary.  Please remember it is important that we get your sugars under better control to keep you well long-term

## 2024-01-04 NOTE — ASSESSMENT & PLAN NOTE
-He describes having loose stools 3 times a day for the past month.  -We will talk about his symptoms again when he returns

## 2024-01-04 NOTE — ASSESSMENT & PLAN NOTE
-I will do some additional laboratory testing  -This was discovered on CT scan on December 25 at [facility]

## 2024-01-04 NOTE — ASSESSMENT & PLAN NOTE
"-This was discovered on a recent CT scan on December 25 at [facility] and the radiologist describes seeing \"mild \"splenomegaly  "

## 2024-01-04 NOTE — ASSESSMENT & PLAN NOTE
-He will continue with Dr. Frandy Moseley  -He will continue his insulin pump and continuous glucose monitoring  -I will ask him to provide a summary of his blood sugar readings when he returns for follow-up

## 2024-01-04 NOTE — ASSESSMENT & PLAN NOTE
-Likely multifactorial  -I am doing a nocturnal pulse oximetry screen due to his tonsillar hypertrophy a and his fatigue.

## 2024-01-04 NOTE — PROGRESS NOTES
"Subjective   Patient ID: Buzz Chatman is a 23 y.o. male who presents for Establish Care, Liver Eval (Enlarged liver and enlarged spleen), and Blood Sugar Problem.  He is here today to get established and is accompanied by his aunt, Lissette. Lissette explains that she is accompanying him today but normally her sister, Sonia is involved with his health care and assisting him with his day-to-day needs.  They both explained that on Christmas morning awoke with severe abdominal pain.  He states he felt perfectly fine the night before.  His abdominal pain  became more and more severe to the point that he was \"doubled over \".  They went to the emergency department at [place] and there he had a CT scan of his abdomen and pelvis.  We reviewed those results together.  They did not see anything of a serious nature with the exception of what they characterized as an enlarged liver and spleen.  The spleen is described as being mildly enlarged.  There is no lymphadenopathy and no other suspicious findings.  He states that later that day his pain completely resolved and has not returned.  He does complain of being tired but otherwise no other complaints.  He had several labs performed at [place] and again overall there were no real suspicious findings.  I have decided to do some additional lab work and we will see him back in follow-up.  We talked about his diabetes and unfortunately he was diagnosed with type 1 diabetes at age 4.  He has been following with Dr. Frandy Moseley for a couple years and unfortunately his blood sugars have not been under good control.  They tell me that his last hemoglobin A1c was above 10.  Lissette went on to explain that does not always engage with his health issues or \"do the right thing \".  In fact did explain that Sonia actually has control of his continuous glucose monitor and she is keeping track and advising him on addressing his insulin needs.  She states that sometimes he will decide to eat a " "whole pizza and turn off his monitoring devise.  He has been diagnosed with depression and also with \"central processing disorder \".  He does not like to engage in social activities and he is not really social overall.  He has had recently had a medication change and he does see a counselor.  We spent some time discussing the importance of keeping his sugars under control.  We talked about producing some accountability and a 21 willing to converse with him every 2 weeks on his sugars until we get them down.  He of course will continue to follow closely with his endocrinologist.  He also complains of having some diarrhea over the past month whereby he has pudding-like consistency to his stools 3 times a day.  He denies any current abdominal pain or black or bloody stools.  He complains of chronic fatigue which I am sure is multifactorial but on today's examination he does have very large tonsils and I decided to do a screening pulse oximeter test.  He also has thyromegaly and I am ordering an ultrasound.  We will also do additional lab work and see him back in follow-up.  Review of Systems   Constitutional:  Positive for fatigue.   Respiratory:  Negative for cough, chest tightness, shortness of breath and wheezing.    Cardiovascular:  Negative for chest pain, palpitations and leg swelling.   Gastrointestinal:  Positive for diarrhea. Negative for abdominal pain, blood in stool, nausea and vomiting.   Musculoskeletal:  Negative for arthralgias and back pain.     Objective   Physical Exam  Vitals and nursing note reviewed.   Constitutional:       General: He is not in acute distress.     Appearance: Normal appearance.   HENT:      Head: Normocephalic and atraumatic.      Mouth/Throat:      Comments: Thyromegaly  Bilateral tonsillar hypertrophy  Eyes:      Conjunctiva/sclera: Conjunctivae normal.   Cardiovascular:      Rate and Rhythm: Normal rate and regular rhythm.      Heart sounds: Normal heart sounds.   Pulmonary: " "     Effort: No respiratory distress.      Breath sounds: No wheezing.   Abdominal:      Palpations: Abdomen is soft.      Tenderness: There is no abdominal tenderness. There is no guarding.   Musculoskeletal:         General: No swelling. Normal range of motion.   Skin:     General: Skin is warm and dry.   Neurological:      General: No focal deficit present.      Mental Status: He is alert and oriented to person, place, and time.   Psychiatric:         Behavior: Behavior normal.       Assessment/Plan   Problem List Items Addressed This Visit             ICD-10-CM    Type 1 diabetes mellitus (CMS/HCC) E10.9     -He will continue with Dr. Frandy Moseley  -He will continue his insulin pump and continuous glucose monitoring  -I will ask him to provide a summary of his blood sugar readings when he returns for follow-up         Relevant Orders    Referral to Ophthalmology    Hepatomegaly - Primary R16.0     -I will do some additional laboratory testing  -This was discovered on CT scan on December 25 at [place] emergency department         Relevant Orders    CBC and Auto Differential    AST    ALT    Ferritin    Gamma GT    Hepatitis panel, acute    HIV 1/2 Antigen/Antibody Screen with Reflex to Confirmation    Splenomegaly R16.1     -This was discovered on a recent CT scan on December 25 at [facility] and the radiologist describes seeing \"mild \"splenomegaly         Relevant Orders    CBC and Auto Differential    AST    ALT    Ferritin    Gamma GT    Hepatitis panel, acute    HIV 1/2 Antigen/Antibody Screen with Reflex to Confirmation    Thyromegaly E01.0     -I am ordering an ultrasound         Relevant Orders    US thyroid    Snoring R06.83    Relevant Orders    Pulse oximetry, overnight    Other fatigue R53.83     -Likely multifactorial  -I am doing a nocturnal pulse oximetry screen due to his tonsillar hypertrophy a and his fatigue.         Relevant Orders    Pulse oximetry, overnight    Tonsillar hypertrophy J35.1 "     -Doing a screening nocturnal pulse oximetry         Relevant Orders    Pulse oximetry, overnight    Diarrhea R19.7     -He describes having loose stools 3 times a day for the past month.  -We will talk about his symptoms again when he returns               Beckie Dennison, DO

## 2024-01-08 ENCOUNTER — HOSPITAL ENCOUNTER (OUTPATIENT)
Dept: RADIOLOGY | Facility: HOSPITAL | Age: 24
Discharge: HOME | End: 2024-01-08
Payer: COMMERCIAL

## 2024-01-08 DIAGNOSIS — E01.0 THYROMEGALY: ICD-10-CM

## 2024-01-08 PROCEDURE — 76536 US EXAM OF HEAD AND NECK: CPT

## 2024-01-08 PROCEDURE — 76536 US EXAM OF HEAD AND NECK: CPT | Performed by: RADIOLOGY

## 2024-01-09 DIAGNOSIS — R53.83 OTHER FATIGUE: ICD-10-CM

## 2024-01-09 DIAGNOSIS — R06.83 SNORING: Primary | ICD-10-CM

## 2024-01-16 ENCOUNTER — HOSPITAL ENCOUNTER (OUTPATIENT)
Dept: CARDIOLOGY | Facility: HOSPITAL | Age: 24
Discharge: HOME | End: 2024-01-16
Payer: COMMERCIAL

## 2024-01-16 DIAGNOSIS — R53.83 OTHER FATIGUE: ICD-10-CM

## 2024-01-16 DIAGNOSIS — R06.83 SNORING: ICD-10-CM

## 2024-01-16 PROCEDURE — 9420000001 HC RT PATIENT EDUCATION 5 MIN

## 2024-01-16 PROCEDURE — 94762 N-INVAS EAR/PLS OXIMTRY CONT: CPT

## 2024-01-22 ENCOUNTER — OFFICE VISIT (OUTPATIENT)
Dept: URGENT CARE | Facility: CLINIC | Age: 24
End: 2024-01-22
Payer: COMMERCIAL

## 2024-01-22 VITALS
DIASTOLIC BLOOD PRESSURE: 81 MMHG | SYSTOLIC BLOOD PRESSURE: 140 MMHG | RESPIRATION RATE: 18 BRPM | BODY MASS INDEX: 33 KG/M2 | TEMPERATURE: 98.2 F | OXYGEN SATURATION: 96 % | HEART RATE: 86 BPM | WEIGHT: 271 LBS | HEIGHT: 76 IN

## 2024-01-22 DIAGNOSIS — J06.9 ACUTE UPPER RESPIRATORY INFECTION: Primary | ICD-10-CM

## 2024-01-22 PROCEDURE — 99213 OFFICE O/P EST LOW 20 MIN: CPT | Mod: 25 | Performed by: NURSE PRACTITIONER

## 2024-01-22 PROCEDURE — 87428 SARSCOV & INF VIR A&B AG IA: CPT | Performed by: NURSE PRACTITIONER

## 2024-01-22 NOTE — PROGRESS NOTES
23 y.o. male presents for evaluation of URI.  Symptoms including cough, congestion, body aches, malaise, and headache have been present for 4 days and refractory to OTC meds.  No fever, chills, nausea, vomiting, abdominal pain, CP, or SOB.  No exacerbating factors. No known COVID 19/flu exposure.        Vitals:    01/22/24 1823   BP: 140/81   Pulse: 86   Resp: 18   Temp: 36.8 °C (98.2 °F)   SpO2: 96%       No Known Allergies    Medication Documentation Review Audit       Reviewed by Brody Mendoza MA (Medical Assistant) on 01/22/24 at 1823      Medication Order Taking? Sig Documenting Provider Last Dose Status   albuterol 90 mcg/actuation inhaler [id number] Yes Inhale 2 puffs every 4 hours if needed for wheezing or shortness of breath. Khlaif Johnson MD Taking Active   insulin aspart (NovoLOG U-100 Insulin aspart) 100 unit/mL injection [id number] Yes Inject under the skin 3 times a day before meals. Take as directed per insulin instructions. Khalif Johnson MD Taking Active   Ketone Urine Test strip [id number] Yes 1 strip by Does not apply route if needed. Khalif Johnson MD Taking Active   lamoTRIgine (LaMICtal) 25 mg tablet [id number] Yes Take 2 tablets (50 mg) by mouth once daily. Khalif Johnson MD Taking Active   levothyroxine (Synthroid, Levoxyl) 75 mcg tablet [id number] Yes Take 1 tablet (75 mcg) by mouth once daily in the morning. Take before meals. Khalif Johnson MD Taking Active   ondansetron (Zofran) 8 mg tablet [id number] Yes Take 1 tablet (8 mg) by mouth every 8 hours if needed for nausea or vomiting. Khalif Johnson MD Taking Active   Trintellix 10 mg tablet tablet [id number] Yes Take 1 tablet (10 mg) by mouth once daily in the morning. Take before meals. Khalif Johnson MD Taking Active                    Past Medical History:   Diagnosis Date    ADHD (attention deficit hyperactivity disorder)     Anxiety     Autism     Depression     Diabetes mellitus (CMS/HCC)      Diabetic ketoacidosis (CMS/HCC) 01/04/2024    PTSD (post-traumatic stress disorder)     Vitreous floaters of both eyes 08/03/2016       History reviewed. No pertinent surgical history.    ROS  See HPI    Physical Exam  Vitals and nursing note reviewed.   Constitutional:       General: He is not in acute distress.     Appearance: He is ill-appearing (mildly). He is not toxic-appearing or diaphoretic.   HENT:      Head: Normocephalic and atraumatic.      Right Ear: Tympanic membrane, ear canal and external ear normal.      Left Ear: Tympanic membrane, ear canal and external ear normal.      Nose: Congestion present.      Mouth/Throat:      Mouth: Mucous membranes are moist.      Pharynx: Oropharynx is clear.   Eyes:      Extraocular Movements: Extraocular movements intact.      Conjunctiva/sclera: Conjunctivae normal.      Pupils: Pupils are equal, round, and reactive to light.   Cardiovascular:      Rate and Rhythm: Normal rate and regular rhythm.      Pulses: Normal pulses.      Heart sounds: Normal heart sounds.   Pulmonary:      Effort: Pulmonary effort is normal.      Breath sounds: Normal breath sounds.   Lymphadenopathy:      Cervical: No cervical adenopathy.   Skin:     General: Skin is warm.      Capillary Refill: Capillary refill takes less than 2 seconds.   Neurological:      General: No focal deficit present.      Mental Status: He is alert and oriented to person, place, and time.   Psychiatric:         Mood and Affect: Mood normal.         Behavior: Behavior normal.       Recent Results (from the past 1 hour(s))   POCT BD Veritor Triplex Ag    Collection Time: 01/23/24  9:05 AM   Result Value Ref Range    POC Triplex SARS-CoV-2 Ag  Presumptive negative for Triplex SARS-CoV-2 (no antigen detected)     Presumptive negative for Triplex SARS-CoV-2 (no antigen detected)    POC Triplex Flu A-Ag  Presumptive negative for Triplex FLU A (no antigen detected)     Presumptive negative for Triplex FLU A (no antigen  detected)    POC Triplex Flu B-Ag  Presumptive negative for Triplex FLU B (no antigen detected)     Presumptive negative for Triplex FLU B (no antigen detected)       Assessment/Plan/MDM  Buzz was seen today for flu symptoms.  Diagnoses and all orders for this visit:  Acute upper respiratory infection (Primary)  -     POCT BD Veritor Triplex Ag    Presents for evaluation of URI.  Symptoms including cough, congestion, body aches, malaise, and headache have been present for several days and refractory to OTC meds.  No fever, chills, nausea, vomiting, abdominal pain, CP, or SOB.  No exacerbating factors. No known COVID 19/flu exposure.    Burt Kidd, CNP  [facility]  [phone]

## 2024-01-23 LAB
POC TRIPLEX FLU A-AG: NORMAL
POC TRIPLEX FLU B-AG: NORMAL
POC TRIPLEX SARSCOV-2 AG: NORMAL

## 2024-01-25 ENCOUNTER — OFFICE VISIT (OUTPATIENT)
Dept: PRIMARY CARE | Facility: CLINIC | Age: 24
End: 2024-01-25
Payer: COMMERCIAL

## 2024-01-25 VITALS
OXYGEN SATURATION: 97 % | HEART RATE: 103 BPM | SYSTOLIC BLOOD PRESSURE: 138 MMHG | BODY MASS INDEX: 33 KG/M2 | DIASTOLIC BLOOD PRESSURE: 84 MMHG | WEIGHT: 271 LBS | HEIGHT: 76 IN

## 2024-01-25 DIAGNOSIS — R16.0 HEPATOMEGALY: ICD-10-CM

## 2024-01-25 DIAGNOSIS — R16.1 SPLENOMEGALY: ICD-10-CM

## 2024-01-25 DIAGNOSIS — R53.83 OTHER FATIGUE: ICD-10-CM

## 2024-01-25 DIAGNOSIS — J02.9 ACUTE PHARYNGITIS, UNSPECIFIED ETIOLOGY: Primary | ICD-10-CM

## 2024-01-25 PROCEDURE — 3079F DIAST BP 80-89 MM HG: CPT | Performed by: INTERNAL MEDICINE

## 2024-01-25 PROCEDURE — 3075F SYST BP GE 130 - 139MM HG: CPT | Performed by: INTERNAL MEDICINE

## 2024-01-25 PROCEDURE — 1036F TOBACCO NON-USER: CPT | Performed by: INTERNAL MEDICINE

## 2024-01-25 PROCEDURE — 99214 OFFICE O/P EST MOD 30 MIN: CPT | Performed by: INTERNAL MEDICINE

## 2024-01-25 RX ORDER — AMOXICILLIN AND CLAVULANATE POTASSIUM 875; 125 MG/1; MG/1
875 TABLET, FILM COATED ORAL 2 TIMES DAILY
Qty: 20 TABLET | Refills: 0 | Status: SHIPPED | OUTPATIENT
Start: 2024-01-25 | End: 2024-02-05 | Stop reason: WASHOUT

## 2024-01-25 ASSESSMENT — ENCOUNTER SYMPTOMS
ARTHRALGIAS: 0
FATIGUE: 1
SHORTNESS OF BREATH: 0
SORE THROAT: 1
COUGH: 1
PALPITATIONS: 0
WHEEZING: 0
VOMITING: 0
BACK PAIN: 0
DIARRHEA: 0
NAUSEA: 0

## 2024-01-25 ASSESSMENT — PATIENT HEALTH QUESTIONNAIRE - PHQ9
1. LITTLE INTEREST OR PLEASURE IN DOING THINGS: NOT AT ALL
2. FEELING DOWN, DEPRESSED OR HOPELESS: NOT AT ALL
SUM OF ALL RESPONSES TO PHQ9 QUESTIONS 1 AND 2: 0

## 2024-01-25 NOTE — PATIENT INSTRUCTIONS
"As we discussed based on today's symptoms I am going to treat you for presumed acute pharyngitis.  I sent a prescription to your pharmacy and please take this as directed with food until gone.  You can also take Tylenol for discomfort and please remember to also drink lots of fluids  When you see your endocrinologist again explained that we did an ultrasound of your thyroid gland and it was described as looking \"hypervascular \".  As we discussed we did several labs to assess your condition and your lab work looked relatively good.  We have not explained why the radiologist thinks you have an enlarged spleen and liver.  When you come back we can discuss it further  Please turn in your nocturnal pulse oximetry testing equipment ASAP and I want to see you back after the study has completed and interpreted.  I am going to schedule a 3-week follow-up because it gives enough time for everything to get done  -Please call me if you are not doing well  "

## 2024-01-25 NOTE — PROGRESS NOTES
"Subjective   Patient ID: Buzz Chatman is a 23 y.o. male who presents for Follow-up (3 week FUV. ).  HPI  He is here today for follow-up and accompanied by his aunt, Sonia.  He actually lives with him he and she helps him a lot with his health issues.  Since our last visit he developed a respiratory infection and he went to the urgent care 2 days ago.  He was tested for COVID and influenza which all came back negative.  He was diagnosed as having a viral infection.  Since that time he states he has developed an intensely sore throat and he also feels febrile.  He states that in the past he has had issues with strep pharyngitis.  On today's examination I do see enlarged red beefy tonsils so I decided to go ahead and treat him for presumed acute strep pharyngitis.  We also reviewed his most recent laboratory test results and was pleased to inform him that there were no significant abnormalities.  We ordered the lab work because of the \"mild splenomegaly \"that was identified on his CT scan at [facility].  We also did a thyroid ultrasound which was described as being \"hypervascular \".  He also did a nocturnal pulse oximetry but he has not turned it in at the hospital yet.  Sonia indicates that she will make sure that gets turned and right away and I do want to see him back after completion of that study.  We talked about his spleen and at this point I am not appreciating any \"red flags \".  We did discuss possibly seeing a hematologist and we can discuss that at his next visit.  He is going to be seeing his endocrinologist soon and his sugars are still quite erratic.  He understands that we need to get really good control of his blood sugars so we can stay healthy long-term.  I did ask that he try to remember to tell Dr. Moseley about his \"hypervascular thyroid \".  We will see him back in follow-up.  Review of Systems   Constitutional:  Positive for fatigue.   HENT:  Positive for sore throat.    Respiratory:  " Positive for cough. Negative for shortness of breath and wheezing.    Cardiovascular:  Negative for palpitations and leg swelling.   Gastrointestinal:  Negative for diarrhea, nausea and vomiting.   Musculoskeletal:  Negative for arthralgias and back pain.     Objective   Physical Exam  Vitals and nursing note reviewed.   Constitutional:       General: He is not in acute distress.     Appearance: Normal appearance.   HENT:      Head: Normocephalic and atraumatic.   Eyes:      Conjunctiva/sclera: Conjunctivae normal.   Cardiovascular:      Rate and Rhythm: Normal rate and regular rhythm.      Heart sounds: Normal heart sounds.   Pulmonary:      Effort: No respiratory distress.      Breath sounds: No wheezing.   Abdominal:      Palpations: Abdomen is soft.      Tenderness: There is no abdominal tenderness. There is no guarding.   Musculoskeletal:         General: No swelling. Normal range of motion.   Skin:     General: Skin is warm and dry.   Neurological:      General: No focal deficit present.      Mental Status: He is alert and oriented to person, place, and time.   Psychiatric:         Behavior: Behavior normal.       Recent Results (from the past 672 hour(s))   CBC and Auto Differential    Collection Time: 01/04/24  9:29 AM   Result Value Ref Range    WBC 5.1 4.4 - 11.3 x10*3/uL    nRBC 0.0 0.0 - 0.0 /100 WBCs    RBC 5.13 4.50 - 5.90 x10*6/uL    Hemoglobin 14.8 13.5 - 17.5 g/dL    Hematocrit 45.6 41.0 - 52.0 %    MCV 89 80 - 100 fL    MCH 28.8 26.0 - 34.0 pg    MCHC 32.5 32.0 - 36.0 g/dL    RDW 12.5 11.5 - 14.5 %    Platelets 230 150 - 450 x10*3/uL    Neutrophils % 51.5 40.0 - 80.0 %    Immature Granulocytes %, Automated 0.6 0.0 - 0.9 %    Lymphocytes % 37.4 13.0 - 44.0 %    Monocytes % 7.2 2.0 - 10.0 %    Eosinophils % 2.7 0.0 - 6.0 %    Basophils % 0.6 0.0 - 2.0 %    Neutrophils Absolute 2.64 1.20 - 7.70 x10*3/uL    Immature Granulocytes Absolute, Automated 0.03 0.00 - 0.70 x10*3/uL    Lymphocytes Absolute 1.92  1.20 - 4.80 x10*3/uL    Monocytes Absolute 0.37 0.10 - 1.00 x10*3/uL    Eosinophils Absolute 0.14 0.00 - 0.70 x10*3/uL    Basophils Absolute 0.03 0.00 - 0.10 x10*3/uL   AST    Collection Time: 01/04/24  9:29 AM   Result Value Ref Range    AST 11 9 - 39 U/L   ALT    Collection Time: 01/04/24  9:29 AM   Result Value Ref Range    ALT 15 10 - 52 U/L   Ferritin    Collection Time: 01/04/24  9:29 AM   Result Value Ref Range    Ferritin 277 20 - 300 ng/mL   Gamma GT    Collection Time: 01/04/24  9:29 AM   Result Value Ref Range    GGT 12 5 - 64 U/L   Hepatitis panel, acute    Collection Time: 01/04/24  9:29 AM   Result Value Ref Range    Hepatitis B Surface AG Nonreactive Nonreactive    Hepatitis A  AB- IgM Nonreactive Nonreactive    Hepatitis B Core AB; IgM Nonreactive Nonreactive    Hepatitis C AB Nonreactive Nonreactive   HIV 1/2 Antigen/Antibody Screen with Reflex to Confirmation    Collection Time: 01/04/24  9:29 AM   Result Value Ref Range    HIV 1/2 Antigen/Antibody Screen with Reflex to Confirmation Nonreactive Nonreactive   POCT BD Veritor Triplex Ag    Collection Time: 01/23/24  9:05 AM   Result Value Ref Range    POC Triplex SARS-CoV-2 Ag  Presumptive negative for Triplex SARS-CoV-2 (no antigen detected)     Presumptive negative for Triplex SARS-CoV-2 (no antigen detected)    POC Triplex Flu A-Ag  Presumptive negative for Triplex FLU A (no antigen detected)     Presumptive negative for Triplex FLU A (no antigen detected)    POC Triplex Flu B-Ag  Presumptive negative for Triplex FLU B (no antigen detected)     Presumptive negative for Triplex FLU B (no antigen detected)       Assessment/Plan   Problem List Items Addressed This Visit             ICD-10-CM    Hepatomegaly R16.0     -This was identified on a recent CT scan at [facility]  -Lab work is negative at this time         Splenomegaly R16.1     -This was identified on CT scan recently at [facility]  -Laboratory blood work is within normal range          Other fatigue R53.83     -Laboratory workup so far is negative  -We will have him return after completion of his nocturnal pulse oximetry screen         Acute pharyngitis - Primary J02.9     -He has been seen and evaluated in urgent care approximately 48 hours ago and had testing for COVID-19 and influenza which came back negative  -Since that time he has developed severe sore throat with fever and he tells me that he has been prone to strep pharyngitis in the past.  Based on today's clinical presentation I did not order a test but I am treating him with an antibiotic         Relevant Medications    amoxicillin-pot clavulanate (Augmentin) 875-125 mg tablet          Beckie Dennison, DO

## 2024-01-25 NOTE — ASSESSMENT & PLAN NOTE
-This was identified on a recent CT scan at [facility]  -Lab work is negative at this time

## 2024-01-25 NOTE — ASSESSMENT & PLAN NOTE
-He has been seen and evaluated in urgent care approximately 48 hours ago and had testing for COVID-19 and influenza which came back negative  -Since that time he has developed severe sore throat with fever and he tells me that he has been prone to strep pharyngitis in the past.  Based on today's clinical presentation I did not order a test but I am treating him with an antibiotic

## 2024-01-25 NOTE — ASSESSMENT & PLAN NOTE
-This was identified on CT scan recently at [facility]  -Laboratory blood work is within normal range

## 2024-01-25 NOTE — ASSESSMENT & PLAN NOTE
-Laboratory workup so far is negative  -We will have him return after completion of his nocturnal pulse oximetry screen

## 2024-02-01 ENCOUNTER — TELEPHONE (OUTPATIENT)
Dept: PRIMARY CARE | Facility: CLINIC | Age: 24
End: 2024-02-01
Payer: COMMERCIAL

## 2024-02-01 NOTE — TELEPHONE ENCOUNTER
Patient called and states that he is having some diarrhea. He states that someone in his family was in the hospital with c-diff.  Nurse instructed patient to use Lomotil; 2 on the first episode and 1 tab after each episode, up to 7 tabs a day.  Nurse instructed patient to call office back if that did not help.    Patient states understanding.

## 2024-02-05 ENCOUNTER — OFFICE VISIT (OUTPATIENT)
Dept: PRIMARY CARE | Facility: CLINIC | Age: 24
End: 2024-02-05
Payer: COMMERCIAL

## 2024-02-05 ENCOUNTER — LAB (OUTPATIENT)
Dept: LAB | Facility: LAB | Age: 24
End: 2024-02-05
Payer: COMMERCIAL

## 2024-02-05 VITALS
DIASTOLIC BLOOD PRESSURE: 86 MMHG | HEART RATE: 91 BPM | WEIGHT: 266.9 LBS | OXYGEN SATURATION: 98 % | SYSTOLIC BLOOD PRESSURE: 138 MMHG | BODY MASS INDEX: 32.49 KG/M2

## 2024-02-05 DIAGNOSIS — E10.9 TYPE 1 DIABETES MELLITUS WITHOUT COMPLICATIONS (MULTI): Primary | ICD-10-CM

## 2024-02-05 DIAGNOSIS — R05.1 ACUTE COUGH: ICD-10-CM

## 2024-02-05 DIAGNOSIS — R19.7 DIARRHEA, UNSPECIFIED TYPE: Primary | ICD-10-CM

## 2024-02-05 PROCEDURE — 3079F DIAST BP 80-89 MM HG: CPT | Performed by: INTERNAL MEDICINE

## 2024-02-05 PROCEDURE — 1036F TOBACCO NON-USER: CPT | Performed by: INTERNAL MEDICINE

## 2024-02-05 PROCEDURE — 99213 OFFICE O/P EST LOW 20 MIN: CPT | Performed by: INTERNAL MEDICINE

## 2024-02-05 PROCEDURE — 3075F SYST BP GE 130 - 139MM HG: CPT | Performed by: INTERNAL MEDICINE

## 2024-02-05 RX ORDER — BENZONATATE 200 MG/1
200 CAPSULE ORAL 3 TIMES DAILY PRN
Qty: 42 CAPSULE | Refills: 0 | Status: SHIPPED | OUTPATIENT
Start: 2024-02-05 | End: 2024-03-06

## 2024-02-05 ASSESSMENT — ENCOUNTER SYMPTOMS
ARTHRALGIAS: 0
BACK PAIN: 0
DIARRHEA: 1
NAUSEA: 0
BLOOD IN STOOL: 0
PALPITATIONS: 0
VOMITING: 0
FEVER: 0
WHEEZING: 0
SHORTNESS OF BREATH: 0
COUGH: 1
FATIGUE: 1
ABDOMINAL PAIN: 0

## 2024-02-05 NOTE — LETTER
February 5, 2024     Patient: Buzz Chatman   YOB: 2000   Date of Visit: 2/5/2024       To Whom It May Concern:    Buzz Chatman was seen in my clinic on 2/5/2024 at 7:40 am. Please excuse Buzz from work 02/01/24 through 02/09/24.     If you have any questions or concerns, please don't hesitate to call.         Sincerely,         Beckie Dennison, DO        CC: No Recipients

## 2024-02-05 NOTE — PATIENT INSTRUCTIONS
As we discussed I am asking you to stop your prescription for Augmentin because you are almost done anyway and this antibiotic can cause some diarrhea as a side effect   I am also asking that you purchase a probiotic over-the-counter called align and take that once a day.  The probiotic helps replenish healthy bacteria to your colon so that you have a balanced colony of bacteria which usually can help the diarrhea a lot  I will have you submit a stool specimen just check for C. difficile and we will contact you  Try to keep a bland diet and avoid spicy foods or milk based products for the next couple of days.  We also recommend avoiding acidic foods like tomatoes or citrus juices etc.  I sent a prescription for Tessalon to help with your cough  Please let me know how you are doing  As we discussed we talked about time off from work and you indicated that you needed until Friday to be off for healing so I will have the staff give you a work excuse until Friday.

## 2024-02-05 NOTE — PROGRESS NOTES
Subjective   Patient ID: Buzz Chatman is a 23 y.o. male who presents for No chief complaint on file..  HPI  He is a today for follow-up after a recent visit to the [facility] emergency room.  He went in complaining of diarrhea.  I was unable to access any testing that they performed but I did send him home with instruction to follow-up here.  We are reminded that he was prescribed Augmentin for a respiratory infection and I explained today that one of the side effects of this antibiotic can be diarrhea.  He then went on to explain he has had diarrhea for probably 3 weeks.  He describes having anywhere from 3-4 bowel movements a day and sometimes are very loose or watery.  He denies any abdominal pain and no black or bloody stools.  He also denies any fevers.  He does continue to cough which has been essentially nonproductive.  I am having him stop his Augmentin and we will send a stool for C. difficile.  I have agreed to contact him with results.  I am also recommending he purchase an over-the-counter probiotic called Align and he will try to maintain the brat diet for the next couple of days until he starts feeling better.  Review of Systems   Constitutional:  Positive for fatigue. Negative for fever.   Respiratory:  Positive for cough. Negative for shortness of breath and wheezing.    Cardiovascular:  Negative for chest pain, palpitations and leg swelling.   Gastrointestinal:  Positive for diarrhea. Negative for abdominal pain, blood in stool, nausea and vomiting.   Musculoskeletal:  Negative for arthralgias and back pain.     Objective   Physical Exam  Vitals and nursing note reviewed.   Constitutional:       General: He is not in acute distress.     Appearance: Normal appearance.   HENT:      Head: Normocephalic and atraumatic.   Eyes:      Conjunctiva/sclera: Conjunctivae normal.   Cardiovascular:      Rate and Rhythm: Normal rate and regular rhythm.      Heart sounds: Normal heart sounds.   Pulmonary:       Effort: No respiratory distress.      Breath sounds: No wheezing.   Abdominal:      Palpations: Abdomen is soft.      Tenderness: There is no abdominal tenderness. There is no guarding.   Musculoskeletal:         General: No swelling. Normal range of motion.   Skin:     General: Skin is warm and dry.   Neurological:      General: No focal deficit present.      Mental Status: He is alert and oriented to person, place, and time.   Psychiatric:         Behavior: Behavior normal.       Assessment/Plan   Problem List Items Addressed This Visit             ICD-10-CM    Diarrhea - Primary R19.7     -He is almost done with his Augmentin antibiotics so he will stop it for now  -He had a visit to [facility] emergency department on the first  -He is having 3-4 stools a day that are consistent with very loose or watery but no black or bloody stools and no fevers  -I am checking a C. difficile for safe measures since he reports having diarrhea before the antibiotic  -I am asking that he go on the probiotic called Align  -Will also do the brat diet with electrolyte rich fluids for the next 24 to 48 hours and he will give me an update.         Relevant Orders    C. difficile, PCR     Other Visit Diagnoses         Codes    Acute cough     R05.1    Relevant Medications    benzonatate (Tessalon) 200 mg capsule               Beckie Dennison, DO

## 2024-02-05 NOTE — ASSESSMENT & PLAN NOTE
-He is almost done with his Augmentin antibiotics so he will stop it for now  -He had a visit to [facility] emergency department on the first  -He is having 3-4 stools a day that are consistent with very loose or watery but no black or bloody stools and no fevers  -I am checking a C. difficile for safe measures since he reports having diarrhea before the antibiotic  -I am asking that he go on the probiotic called Align  -Will also do the brat diet with electrolyte rich fluids for the next 24 to 48 hours and he will give me an update.

## 2024-02-07 ENCOUNTER — APPOINTMENT (OUTPATIENT)
Dept: LAB | Facility: LAB | Age: 24
End: 2024-02-07
Payer: COMMERCIAL

## 2024-02-07 ENCOUNTER — LAB (OUTPATIENT)
Dept: LAB | Facility: LAB | Age: 24
End: 2024-02-07
Payer: COMMERCIAL

## 2024-02-07 DIAGNOSIS — E10.9 TYPE 1 DIABETES MELLITUS WITHOUT COMPLICATIONS (MULTI): Primary | ICD-10-CM

## 2024-02-07 LAB
ALBUMIN SERPL BCP-MCNC: 4.5 G/DL (ref 3.4–5)
ALP SERPL-CCNC: 70 U/L (ref 33–120)
ALT SERPL W P-5'-P-CCNC: 19 U/L (ref 10–52)
ANION GAP SERPL CALC-SCNC: 11 MMOL/L (ref 10–20)
AST SERPL W P-5'-P-CCNC: 14 U/L (ref 9–39)
BILIRUB SERPL-MCNC: 0.6 MG/DL (ref 0–1.2)
BUN SERPL-MCNC: 20 MG/DL (ref 6–23)
C DIF TOX TCDA+TCDB STL QL NAA+PROBE: NOT DETECTED
CALCIUM SERPL-MCNC: 9.5 MG/DL (ref 8.6–10.3)
CHLORIDE SERPL-SCNC: 102 MMOL/L (ref 98–107)
CHOLEST SERPL-MCNC: 132 MG/DL (ref 0–199)
CHOLESTEROL/HDL RATIO: 3.3
CO2 SERPL-SCNC: 31 MMOL/L (ref 21–32)
CREAT SERPL-MCNC: 0.82 MG/DL (ref 0.5–1.3)
CREAT UR-MCNC: 113.4 MG/DL (ref 20–370)
EGFRCR SERPLBLD CKD-EPI 2021: >90 ML/MIN/1.73M*2
GLUCOSE SERPL-MCNC: 154 MG/DL (ref 74–99)
HDLC SERPL-MCNC: 40 MG/DL
LDLC SERPL CALC-MCNC: 70 MG/DL
MICROALBUMIN UR-MCNC: 23.3 MG/L
MICROALBUMIN/CREAT UR: 20.5 UG/MG CREAT
NON HDL CHOLESTEROL: 92 MG/DL (ref 0–149)
POTASSIUM SERPL-SCNC: 4.4 MMOL/L (ref 3.5–5.3)
PROT SERPL-MCNC: 7 G/DL (ref 6.4–8.2)
SODIUM SERPL-SCNC: 140 MMOL/L (ref 136–145)
T4 FREE SERPL-MCNC: 1.26 NG/DL (ref 0.61–1.12)
TRIGL SERPL-MCNC: 112 MG/DL (ref 0–149)
TSH SERPL-ACNC: 0.87 MIU/L (ref 0.44–3.98)
VLDL: 22 MG/DL (ref 0–40)

## 2024-02-07 PROCEDURE — 82043 UR ALBUMIN QUANTITATIVE: CPT

## 2024-02-07 PROCEDURE — 84443 ASSAY THYROID STIM HORMONE: CPT

## 2024-02-07 PROCEDURE — 80061 LIPID PANEL: CPT

## 2024-02-07 PROCEDURE — 87493 C DIFF AMPLIFIED PROBE: CPT

## 2024-02-07 PROCEDURE — 80053 COMPREHEN METABOLIC PANEL: CPT

## 2024-02-07 PROCEDURE — 84439 ASSAY OF FREE THYROXINE: CPT

## 2024-02-07 PROCEDURE — 82570 ASSAY OF URINE CREATININE: CPT

## 2024-02-07 PROCEDURE — 36415 COLL VENOUS BLD VENIPUNCTURE: CPT

## 2024-03-04 ENCOUNTER — APPOINTMENT (OUTPATIENT)
Dept: PRIMARY CARE | Facility: CLINIC | Age: 24
End: 2024-03-04
Payer: COMMERCIAL

## 2024-03-07 ENCOUNTER — OFFICE VISIT (OUTPATIENT)
Dept: PRIMARY CARE | Facility: CLINIC | Age: 24
End: 2024-03-07
Payer: COMMERCIAL

## 2024-03-07 VITALS
HEART RATE: 85 BPM | OXYGEN SATURATION: 98 % | DIASTOLIC BLOOD PRESSURE: 82 MMHG | SYSTOLIC BLOOD PRESSURE: 124 MMHG | WEIGHT: 272.2 LBS | BODY MASS INDEX: 33.13 KG/M2

## 2024-03-07 DIAGNOSIS — R19.7 DIARRHEA, UNSPECIFIED TYPE: ICD-10-CM

## 2024-03-07 DIAGNOSIS — R06.83 SNORING: Primary | ICD-10-CM

## 2024-03-07 DIAGNOSIS — R73.9 HYPERGLYCEMIA: ICD-10-CM

## 2024-03-07 PROBLEM — J02.9 ACUTE PHARYNGITIS: Status: RESOLVED | Noted: 2024-01-25 | Resolved: 2024-03-07

## 2024-03-07 PROCEDURE — 3079F DIAST BP 80-89 MM HG: CPT | Performed by: INTERNAL MEDICINE

## 2024-03-07 PROCEDURE — 1036F TOBACCO NON-USER: CPT | Performed by: INTERNAL MEDICINE

## 2024-03-07 PROCEDURE — 99213 OFFICE O/P EST LOW 20 MIN: CPT | Performed by: INTERNAL MEDICINE

## 2024-03-07 PROCEDURE — 3048F LDL-C <100 MG/DL: CPT | Performed by: INTERNAL MEDICINE

## 2024-03-07 PROCEDURE — 3074F SYST BP LT 130 MM HG: CPT | Performed by: INTERNAL MEDICINE

## 2024-03-07 PROCEDURE — 3061F NEG MICROALBUMINURIA REV: CPT | Performed by: INTERNAL MEDICINE

## 2024-03-07 ASSESSMENT — ENCOUNTER SYMPTOMS
FEVER: 0
SHORTNESS OF BREATH: 0
PALPITATIONS: 0
COUGH: 0
BACK PAIN: 0
WHEEZING: 0
ABDOMINAL PAIN: 0
DIARRHEA: 0
ARTHRALGIAS: 0
NAUSEA: 0
VOMITING: 0
BLOOD IN STOOL: 0
FATIGUE: 1

## 2024-03-07 NOTE — ASSESSMENT & PLAN NOTE
-His condition has resolved  -He was having severe symptoms from the dates of February 1 through February 9  -We filled out [place] paperwork and he will let me know if he is having any recurring symptoms

## 2024-03-07 NOTE — PATIENT INSTRUCTIONS
As we discussed we will be sending your paperwork to the [employer] and hopefully this will satisfy the information that they are requesting.  If not please let me know and we can revise as needed  Please also let me know if you have recurring problems with your digestive system  Obviously we want you to continue seeing your endocrinologist  We will call you once we track down the results of your nocturnal pulse oximetry screen and if it is significantly abnormal I will be ordering a sleep study  Otherwise if everything goes according to plan we will see you back in 6 months and I am not ordering any lab work since it is done routinely through your endocrinologist

## 2024-03-07 NOTE — PROGRESS NOTES
Subjective   Patient ID: Buzz Chatman is a 23 y.o. male who presents for No chief complaint on file..  HPI  He is here today for follow-up.  We saw him a little over a month ago and he was having significant diarrhea.  It was so bad that he had to miss work for several days.  He completed his Augmentin therapy and thankfully both his cold and his diarrhea completely resolved.  He states he does not feel like he is having issues now.  Because he did miss work from February 1 through February 9 we received a [place] paper documentation to fill out from the [employer].  We did fill it out to the best of our ability today and we will fax it in.  I told him if there is any additional information needed I am happy to submit it.  I am also grateful that his diarrhea has resolved.  He does continue to suffer from fatigue and we did actually order a nocturnal pulse oximetry screen but I have not received notification of the results.  I promised him we would look that up and I will contact him.  We did conduct a review of systems and if everything goes according to plan  Review of Systems   Constitutional:  Positive for fatigue. Negative for fever.   Respiratory:  Negative for cough, shortness of breath and wheezing.    Cardiovascular:  Negative for chest pain, palpitations and leg swelling.   Gastrointestinal:  Negative for abdominal pain, blood in stool, diarrhea, nausea and vomiting.   Musculoskeletal:  Negative for arthralgias and back pain.     Objective   Physical Exam  Vitals and nursing note reviewed.   Constitutional:       General: He is not in acute distress.     Appearance: Normal appearance.   HENT:      Head: Normocephalic and atraumatic.   Eyes:      Conjunctiva/sclera: Conjunctivae normal.   Cardiovascular:      Rate and Rhythm: Normal rate and regular rhythm.      Heart sounds: Normal heart sounds.   Pulmonary:      Effort: No respiratory distress.      Breath sounds: No wheezing.   Abdominal:       Palpations: Abdomen is soft.      Tenderness: There is no abdominal tenderness. There is no guarding.   Musculoskeletal:         General: No swelling. Normal range of motion.   Skin:     General: Skin is warm and dry.   Neurological:      General: No focal deficit present.      Mental Status: He is alert and oriented to person, place, and time.   Psychiatric:         Behavior: Behavior normal.       Assessment/Plan   Problem List Items Addressed This Visit             ICD-10-CM    Snoring - Primary R06.83     -We will track down his nocturnal pulse oximetry screen and I will contact him with the results         Diarrhea R19.7     -His condition has resolved  -He was having severe symptoms from the dates of February 1 through February 9  -We filled out [place] paperwork and he will let me know if he is having any recurring symptoms         Hyperglycemia R73.9          Beckie Dennison, DO

## 2024-03-11 DIAGNOSIS — R06.83 SNORING: Primary | ICD-10-CM

## 2024-03-11 DIAGNOSIS — R53.83 OTHER FATIGUE: ICD-10-CM

## 2024-03-11 DIAGNOSIS — E66.09 CLASS 1 OBESITY DUE TO EXCESS CALORIES WITH SERIOUS COMORBIDITY AND BODY MASS INDEX (BMI) OF 33.0 TO 33.9 IN ADULT: ICD-10-CM

## 2024-03-12 ENCOUNTER — TELEPHONE (OUTPATIENT)
Dept: PRIMARY CARE | Facility: CLINIC | Age: 24
End: 2024-03-12
Payer: COMMERCIAL

## 2024-03-12 NOTE — TELEPHONE ENCOUNTER
----- Message from Beckie Dennison DO sent at 3/11/2024  5:53 PM EDT -----  Regarding: Sleep study  I was able to reach him this evening regarding his nocturnal pulse oximetry screen.  I explained that it did show some mild desaturations but nothing major.  In discussing his symptoms further he does have several risk factors for sleep apnea including hypersomnolence and feeling fatigue when he wakes up in the morning.  He is agreeable to doing a formal sleep study and I told him we will submit it and see if insurance will cover it.  I told him somebody will be getting in touch with him.  Thank you

## 2024-05-27 ENCOUNTER — APPOINTMENT (OUTPATIENT)
Dept: RADIOLOGY | Facility: HOSPITAL | Age: 24
End: 2024-05-27
Payer: COMMERCIAL

## 2024-05-27 ENCOUNTER — HOSPITAL ENCOUNTER (EMERGENCY)
Facility: HOSPITAL | Age: 24
Discharge: HOME | End: 2024-05-28
Attending: EMERGENCY MEDICINE
Payer: COMMERCIAL

## 2024-05-27 DIAGNOSIS — S30.1XXA CONTUSION OF ABDOMINAL WALL, INITIAL ENCOUNTER: Primary | ICD-10-CM

## 2024-05-27 LAB
ABO GROUP (TYPE) IN BLOOD: NORMAL
ALBUMIN SERPL BCP-MCNC: 4.3 G/DL (ref 3.4–5)
ALP SERPL-CCNC: 83 U/L (ref 33–120)
ALT SERPL W P-5'-P-CCNC: 17 U/L (ref 10–52)
ANION GAP SERPL CALC-SCNC: 12 MMOL/L (ref 10–20)
ANTIBODY SCREEN: NORMAL
AST SERPL W P-5'-P-CCNC: 14 U/L (ref 9–39)
BASOPHILS # BLD AUTO: 0.02 X10*3/UL (ref 0–0.1)
BASOPHILS NFR BLD AUTO: 0.3 %
BILIRUB SERPL-MCNC: 0.5 MG/DL (ref 0–1.2)
BUN SERPL-MCNC: 17 MG/DL (ref 6–23)
CALCIUM SERPL-MCNC: 9 MG/DL (ref 8.6–10.3)
CHLORIDE SERPL-SCNC: 99 MMOL/L (ref 98–107)
CO2 SERPL-SCNC: 27 MMOL/L (ref 21–32)
CREAT SERPL-MCNC: 0.88 MG/DL (ref 0.5–1.3)
EGFRCR SERPLBLD CKD-EPI 2021: >90 ML/MIN/1.73M*2
EOSINOPHIL # BLD AUTO: 0.22 X10*3/UL (ref 0–0.7)
EOSINOPHIL NFR BLD AUTO: 2.9 %
ERYTHROCYTE [DISTWIDTH] IN BLOOD BY AUTOMATED COUNT: 12.9 % (ref 11.5–14.5)
GLUCOSE SERPL-MCNC: 337 MG/DL (ref 74–99)
HCT VFR BLD AUTO: 46.2 % (ref 41–52)
HGB BLD-MCNC: 15.4 G/DL (ref 13.5–17.5)
IMM GRANULOCYTES # BLD AUTO: 0.04 X10*3/UL (ref 0–0.7)
IMM GRANULOCYTES NFR BLD AUTO: 0.5 % (ref 0–0.9)
LYMPHOCYTES # BLD AUTO: 3.47 X10*3/UL (ref 1.2–4.8)
LYMPHOCYTES NFR BLD AUTO: 45.1 %
MCH RBC QN AUTO: 29.5 PG (ref 26–34)
MCHC RBC AUTO-ENTMCNC: 33.3 G/DL (ref 32–36)
MCV RBC AUTO: 89 FL (ref 80–100)
MONOCYTES # BLD AUTO: 0.58 X10*3/UL (ref 0.1–1)
MONOCYTES NFR BLD AUTO: 7.5 %
NEUTROPHILS # BLD AUTO: 3.37 X10*3/UL (ref 1.2–7.7)
NEUTROPHILS NFR BLD AUTO: 43.7 %
NRBC BLD-RTO: 0 /100 WBCS (ref 0–0)
PLATELET # BLD AUTO: 232 X10*3/UL (ref 150–450)
POTASSIUM SERPL-SCNC: 4.2 MMOL/L (ref 3.5–5.3)
PROT SERPL-MCNC: 7.1 G/DL (ref 6.4–8.2)
RBC # BLD AUTO: 5.22 X10*6/UL (ref 4.5–5.9)
RH FACTOR (ANTIGEN D): NORMAL
SODIUM SERPL-SCNC: 134 MMOL/L (ref 136–145)
WBC # BLD AUTO: 7.7 X10*3/UL (ref 4.4–11.3)

## 2024-05-27 PROCEDURE — 70450 CT HEAD/BRAIN W/O DYE: CPT | Performed by: RADIOLOGY

## 2024-05-27 PROCEDURE — 74177 CT ABD & PELVIS W/CONTRAST: CPT | Performed by: RADIOLOGY

## 2024-05-27 PROCEDURE — 2550000001 HC RX 255 CONTRASTS: Performed by: EMERGENCY MEDICINE

## 2024-05-27 PROCEDURE — 96360 HYDRATION IV INFUSION INIT: CPT

## 2024-05-27 PROCEDURE — 70450 CT HEAD/BRAIN W/O DYE: CPT

## 2024-05-27 PROCEDURE — 71260 CT THORAX DX C+: CPT | Performed by: RADIOLOGY

## 2024-05-27 PROCEDURE — 80053 COMPREHEN METABOLIC PANEL: CPT | Performed by: EMERGENCY MEDICINE

## 2024-05-27 PROCEDURE — 86900 BLOOD TYPING SEROLOGIC ABO: CPT | Performed by: EMERGENCY MEDICINE

## 2024-05-27 PROCEDURE — 99285 EMERGENCY DEPT VISIT HI MDM: CPT | Mod: 25

## 2024-05-27 PROCEDURE — 72125 CT NECK SPINE W/O DYE: CPT | Performed by: RADIOLOGY

## 2024-05-27 PROCEDURE — 36415 COLL VENOUS BLD VENIPUNCTURE: CPT | Performed by: EMERGENCY MEDICINE

## 2024-05-27 PROCEDURE — 71260 CT THORAX DX C+: CPT

## 2024-05-27 PROCEDURE — 85025 COMPLETE CBC W/AUTO DIFF WBC: CPT | Performed by: EMERGENCY MEDICINE

## 2024-05-27 PROCEDURE — 74177 CT ABD & PELVIS W/CONTRAST: CPT

## 2024-05-27 PROCEDURE — 72125 CT NECK SPINE W/O DYE: CPT

## 2024-05-27 RX ADMIN — IOHEXOL 50 ML: 350 INJECTION, SOLUTION INTRAVENOUS at 23:56

## 2024-05-27 ASSESSMENT — PAIN DESCRIPTION - PAIN TYPE: TYPE: ACUTE PAIN

## 2024-05-27 ASSESSMENT — PAIN SCALES - GENERAL
PAINLEVEL_OUTOF10: 10 - WORST POSSIBLE PAIN
PAINLEVEL_OUTOF10: 10 - WORST POSSIBLE PAIN

## 2024-05-27 ASSESSMENT — PAIN DESCRIPTION - LOCATION: LOCATION: ABDOMEN

## 2024-05-27 ASSESSMENT — COLUMBIA-SUICIDE SEVERITY RATING SCALE - C-SSRS
1. IN THE PAST MONTH, HAVE YOU WISHED YOU WERE DEAD OR WISHED YOU COULD GO TO SLEEP AND NOT WAKE UP?: NO
6. HAVE YOU EVER DONE ANYTHING, STARTED TO DO ANYTHING, OR PREPARED TO DO ANYTHING TO END YOUR LIFE?: NO
2. HAVE YOU ACTUALLY HAD ANY THOUGHTS OF KILLING YOURSELF?: NO

## 2024-05-27 ASSESSMENT — PAIN - FUNCTIONAL ASSESSMENT: PAIN_FUNCTIONAL_ASSESSMENT: 0-10

## 2024-05-27 ASSESSMENT — PAIN DESCRIPTION - ORIENTATION: ORIENTATION: LEFT

## 2024-05-28 ENCOUNTER — APPOINTMENT (OUTPATIENT)
Dept: RADIOLOGY | Facility: HOSPITAL | Age: 24
End: 2024-05-28
Payer: COMMERCIAL

## 2024-05-28 VITALS
DIASTOLIC BLOOD PRESSURE: 78 MMHG | HEART RATE: 78 BPM | RESPIRATION RATE: 16 BRPM | OXYGEN SATURATION: 98 % | BODY MASS INDEX: 33 KG/M2 | WEIGHT: 271 LBS | SYSTOLIC BLOOD PRESSURE: 128 MMHG | HEIGHT: 76 IN | TEMPERATURE: 98.1 F

## 2024-05-28 LAB
APPEARANCE UR: CLEAR
APTT PPP: 34 SECONDS (ref 27–38)
BILIRUB UR STRIP.AUTO-MCNC: NEGATIVE MG/DL
COLOR UR: ABNORMAL
GLUCOSE UR STRIP.AUTO-MCNC: ABNORMAL MG/DL
HOLD SPECIMEN: NORMAL
INR PPP: 0.9 (ref 0.9–1.1)
KETONES UR STRIP.AUTO-MCNC: NEGATIVE MG/DL
LEUKOCYTE ESTERASE UR QL STRIP.AUTO: NEGATIVE
NITRITE UR QL STRIP.AUTO: NEGATIVE
PH UR STRIP.AUTO: 6 [PH]
PROT UR STRIP.AUTO-MCNC: NEGATIVE MG/DL
PROTHROMBIN TIME: 10.5 SECONDS (ref 9.8–12.8)
RBC # UR STRIP.AUTO: NEGATIVE /UL
SP GR UR STRIP.AUTO: 1.04
UROBILINOGEN UR STRIP.AUTO-MCNC: <2 MG/DL

## 2024-05-28 PROCEDURE — 73552 X-RAY EXAM OF FEMUR 2/>: CPT | Mod: LT

## 2024-05-28 PROCEDURE — 73552 X-RAY EXAM OF FEMUR 2/>: CPT | Mod: LEFT SIDE | Performed by: RADIOLOGY

## 2024-05-28 PROCEDURE — 73610 X-RAY EXAM OF ANKLE: CPT | Mod: LT

## 2024-05-28 PROCEDURE — 36415 COLL VENOUS BLD VENIPUNCTURE: CPT | Performed by: EMERGENCY MEDICINE

## 2024-05-28 PROCEDURE — 2550000001 HC RX 255 CONTRASTS: Performed by: EMERGENCY MEDICINE

## 2024-05-28 PROCEDURE — 81003 URINALYSIS AUTO W/O SCOPE: CPT | Performed by: EMERGENCY MEDICINE

## 2024-05-28 PROCEDURE — 85730 THROMBOPLASTIN TIME PARTIAL: CPT | Performed by: EMERGENCY MEDICINE

## 2024-05-28 PROCEDURE — 73590 X-RAY EXAM OF LOWER LEG: CPT | Mod: LT

## 2024-05-28 PROCEDURE — 2500000001 HC RX 250 WO HCPCS SELF ADMINISTERED DRUGS (ALT 637 FOR MEDICARE OP): Performed by: EMERGENCY MEDICINE

## 2024-05-28 PROCEDURE — 73610 X-RAY EXAM OF ANKLE: CPT | Mod: LEFT SIDE | Performed by: RADIOLOGY

## 2024-05-28 PROCEDURE — 73590 X-RAY EXAM OF LOWER LEG: CPT | Mod: LEFT SIDE | Performed by: RADIOLOGY

## 2024-05-28 PROCEDURE — 2500000004 HC RX 250 GENERAL PHARMACY W/ HCPCS (ALT 636 FOR OP/ED)

## 2024-05-28 PROCEDURE — 73502 X-RAY EXAM HIP UNI 2-3 VIEWS: CPT | Mod: LT

## 2024-05-28 PROCEDURE — 96360 HYDRATION IV INFUSION INIT: CPT | Mod: 59

## 2024-05-28 PROCEDURE — 73502 X-RAY EXAM HIP UNI 2-3 VIEWS: CPT | Mod: LEFT SIDE | Performed by: RADIOLOGY

## 2024-05-28 PROCEDURE — 2500000004 HC RX 250 GENERAL PHARMACY W/ HCPCS (ALT 636 FOR OP/ED): Performed by: EMERGENCY MEDICINE

## 2024-05-28 PROCEDURE — 85610 PROTHROMBIN TIME: CPT | Performed by: EMERGENCY MEDICINE

## 2024-05-28 RX ORDER — OXYCODONE AND ACETAMINOPHEN 5; 325 MG/1; MG/1
2 TABLET ORAL EVERY 6 HOURS PRN
Status: DISCONTINUED | OUTPATIENT
Start: 2024-05-28 | End: 2024-05-28

## 2024-05-28 RX ORDER — OXYCODONE AND ACETAMINOPHEN 5; 325 MG/1; MG/1
4 TABLET ORAL EVERY 6 HOURS PRN
Status: DISCONTINUED | OUTPATIENT
Start: 2024-05-28 | End: 2024-05-28 | Stop reason: HOSPADM

## 2024-05-28 RX ORDER — ONDANSETRON HYDROCHLORIDE 2 MG/ML
INJECTION, SOLUTION INTRAVENOUS
Status: COMPLETED
Start: 2024-05-28 | End: 2024-05-28

## 2024-05-28 RX ORDER — IBUPROFEN 800 MG/1
800 TABLET ORAL 3 TIMES DAILY
Qty: 21 TABLET | Refills: 0 | Status: SHIPPED | OUTPATIENT
Start: 2024-05-28 | End: 2024-06-04

## 2024-05-28 RX ORDER — OXYCODONE AND ACETAMINOPHEN 5; 325 MG/1; MG/1
4 TABLET ORAL EVERY 6 HOURS PRN
Status: DISCONTINUED | OUTPATIENT
Start: 2024-05-28 | End: 2024-05-28

## 2024-05-28 RX ORDER — MORPHINE SULFATE 4 MG/ML
2 INJECTION, SOLUTION INTRAMUSCULAR; INTRAVENOUS ONCE
Status: COMPLETED | OUTPATIENT
Start: 2024-05-28 | End: 2024-05-28

## 2024-05-28 RX ORDER — MORPHINE SULFATE 2 MG/ML
INJECTION, SOLUTION INTRAMUSCULAR; INTRAVENOUS
Status: COMPLETED
Start: 2024-05-28 | End: 2024-05-28

## 2024-05-28 RX ORDER — ONDANSETRON HYDROCHLORIDE 2 MG/ML
4 INJECTION, SOLUTION INTRAVENOUS ONCE
Status: COMPLETED | OUTPATIENT
Start: 2024-05-28 | End: 2024-05-28

## 2024-05-28 RX ADMIN — ONDANSETRON 4 MG: 2 INJECTION INTRAMUSCULAR; INTRAVENOUS at 00:05

## 2024-05-28 RX ADMIN — OXYCODONE HYDROCHLORIDE AND ACETAMINOPHEN 4 TABLET: 5; 325 TABLET ORAL at 02:47

## 2024-05-28 RX ADMIN — MORPHINE SULFATE 2 MG: 2 INJECTION, SOLUTION INTRAMUSCULAR; INTRAVENOUS at 00:05

## 2024-05-28 RX ADMIN — SODIUM CHLORIDE 1000 ML: 9 INJECTION, SOLUTION INTRAVENOUS at 00:04

## 2024-05-28 ASSESSMENT — PAIN SCALES - GENERAL: PAINLEVEL_OUTOF10: 9

## 2024-05-28 ASSESSMENT — PAIN - FUNCTIONAL ASSESSMENT: PAIN_FUNCTIONAL_ASSESSMENT: 0-10
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
## 2024-05-29 ENCOUNTER — HOSPITAL ENCOUNTER (EMERGENCY)
Age: 24
Discharge: HOME | End: 2024-05-29
Payer: COMMERCIAL

## 2024-05-29 VITALS — BODY MASS INDEX: 32.7 KG/M2

## 2024-05-29 VITALS
HEART RATE: 87 BPM | TEMPERATURE: 98 F | SYSTOLIC BLOOD PRESSURE: 147 MMHG | DIASTOLIC BLOOD PRESSURE: 86 MMHG | RESPIRATION RATE: 16 BRPM | OXYGEN SATURATION: 99 %

## 2024-05-29 VITALS
TEMPERATURE: 98 F | RESPIRATION RATE: 16 BRPM | OXYGEN SATURATION: 99 % | DIASTOLIC BLOOD PRESSURE: 86 MMHG | HEART RATE: 87 BPM | SYSTOLIC BLOOD PRESSURE: 147 MMHG
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
## 2024-05-29 DIAGNOSIS — S20.212A: ICD-10-CM
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
## 2024-05-29 DIAGNOSIS — Z96.41: ICD-10-CM

## 2024-05-29 DIAGNOSIS — E10.9: ICD-10-CM

## 2024-05-29 DIAGNOSIS — S43.402A: Primary | ICD-10-CM

## 2024-05-29 DIAGNOSIS — Z87.891: ICD-10-CM

## 2024-05-29 DIAGNOSIS — S83.92XA: ICD-10-CM

## 2024-05-29 PROCEDURE — 99282 EMERGENCY DEPT VISIT SF MDM: CPT

## 2024-05-29 PROCEDURE — 71045 X-RAY EXAM CHEST 1 VIEW: CPT

## 2024-06-27 ENCOUNTER — EVALUATION (OUTPATIENT)
Dept: PHYSICAL THERAPY | Facility: CLINIC | Age: 24
End: 2024-06-27
Payer: COMMERCIAL

## 2024-06-27 DIAGNOSIS — M25.562 KNEE PAIN, LEFT: ICD-10-CM

## 2024-06-27 PROCEDURE — 97161 PT EVAL LOW COMPLEX 20 MIN: CPT | Mod: GP

## 2024-06-27 ASSESSMENT — PATIENT HEALTH QUESTIONNAIRE - PHQ9
SUM OF ALL RESPONSES TO PHQ9 QUESTIONS 1 AND 2: 0
1. LITTLE INTEREST OR PLEASURE IN DOING THINGS: NOT AT ALL
2. FEELING DOWN, DEPRESSED OR HOPELESS: NOT AT ALL

## 2024-06-27 ASSESSMENT — ENCOUNTER SYMPTOMS
OCCASIONAL FEELINGS OF UNSTEADINESS: 1
DEPRESSION: 0
LOSS OF SENSATION IN FEET: 0

## 2024-06-27 ASSESSMENT — PAIN SCALES - GENERAL: PAINLEVEL_OUTOF10: 7

## 2024-06-27 ASSESSMENT — PAIN - FUNCTIONAL ASSESSMENT: PAIN_FUNCTIONAL_ASSESSMENT: 0-10

## 2024-06-27 ASSESSMENT — ACTIVITIES OF DAILY LIVING (ADL): EFFECT OF PAIN ON DAILY ACTIVITIES: SEE GOALS
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
## 2024-07-08 ENCOUNTER — DOCUMENTATION (OUTPATIENT)
Dept: PHYSICAL THERAPY | Facility: CLINIC | Age: 24
End: 2024-07-08
Payer: COMMERCIAL

## 2024-07-08 NOTE — PROGRESS NOTES
Physical Therapy                 Therapy Communication Note    Patient Name: Buzz Chatman  MRN: [id number]  Today's Date: 7/8/2024     Discipline: Physical Therapy    Missed Visit Reason:      Missed Time: No Show    Comment:

## 2024-07-10 ENCOUNTER — TREATMENT (OUTPATIENT)
Dept: PHYSICAL THERAPY | Facility: CLINIC | Age: 24
End: 2024-07-10
Payer: COMMERCIAL

## 2024-07-10 DIAGNOSIS — M25.562 KNEE PAIN, LEFT: ICD-10-CM

## 2024-07-10 PROCEDURE — 97110 THERAPEUTIC EXERCISES: CPT | Mod: GP,CQ

## 2024-07-10 ASSESSMENT — PAIN SCALES - GENERAL: PAINLEVEL_OUTOF10: 6

## 2024-07-10 ASSESSMENT — PAIN - FUNCTIONAL ASSESSMENT: PAIN_FUNCTIONAL_ASSESSMENT: 0-10

## 2024-07-10 NOTE — PROGRESS NOTES
"Physical Therapy Treatment    Patient Name: Buzz Chatman  MRN: [id number]  Today's Date: 7/10/2024  Time Calculation  Start Time: 0835  Stop Time: 0915  Time Calculation (min): 40 min  PT Therapeutic Procedures Time Entry  Therapeutic Exercise Time Entry: 38       Assessment:   Patient appropriately challenged. Patient able to tolerate session with mild difficulty and demo's fatigue in calves with heel raises. Patient tolerate PRE's with no c/o increased symptoms. Demo's improvements in symptoms pre and post session.     Plan:  Continue to work on improving strength and ROM to be able to return to normal work duties with little to no difficulty. -AB.     OP PT Plan  Treatment/Interventions: Aquatic therapy, Cryotherapy, Education/ Instruction, Electrical stimulation, Gait training, Hot pack, Manual therapy, Neuromuscular re-education, Self care/ home management, Therapeutic exercises, Other (comment) (IASTM/cupping)  PT Plan: Skilled PT  PT Frequency: 2 times per week  Duration: 6wks  Onset Date: 05/27/24  Rehab Potential: Good  Plan of Care Agreement: Patient    Current Problem  Problem List Items Addressed This Visit             ICD-10-CM    Knee pain, left M25.562       Subjective   General  Reason for Referral: knee pain L  Referred By: Torrie  General Comment: 2/9  HEP: Yes  Patient states that his brace has been bothering him d/t the metal going into the back of his knee.     Precautions  Precautions  Braces Applied: L knee soft LB; x2 crutches  Precautions Comment: mod fall risk; WBAT    Pain  Pain Assessment: 0-10  0-10 (Numeric) Pain Score: 6  Pain Location: Knee  Pain Orientation: Left    Objective     Treatments:  Therapeutic Exercise: 38 minutes, 3 units  SciFit x5' (N)  Slantboard 2x1' (N)  Step ups 6\" step x10 Fwd/Lateral L leading (N)  Standing heel raises 2x10 Bilat (N)  LAQ's 2x10 L leading (N)  HS curl 2x10 L Green Tband   Standing TKE 2x10 Blue Tband L (N)  Seated HS stretch 3x30\" L (N)    OP " "EDUCATION:       Goals:  Active       PT Problem       PT Goal 1       Start:  06/27/24    Expected End:  09/25/24       1. Independent HEP to allow for 50% reduction in max ADL C/C sx ( 7/10) 2-3wks  2. 0/10 night time sx to allow for uninterrupted sleep x 1wk ( 7/7) 2-3wks  3. Survey score improvement from 26/80 to 40/80 (LEFS) 4-6wks  4. Strength increase to allow for improved ADL gait on flats/stairs (from 3+/5 to 4+/5 L knee) 4-6wks  5. ROM increase to allow for improved ADL dressing lowers (from 35 to 15 ative flexion) 4-6wks           PT Goal 2       Start:  06/27/24    Expected End:  09/25/24       1. \"I want my  knee  to feel better\".             "

## 2024-07-15 ENCOUNTER — TREATMENT (OUTPATIENT)
Dept: PHYSICAL THERAPY | Facility: CLINIC | Age: 24
End: 2024-07-15
Payer: COMMERCIAL

## 2024-07-15 DIAGNOSIS — M25.562 KNEE PAIN, LEFT: ICD-10-CM

## 2024-07-15 PROCEDURE — 97110 THERAPEUTIC EXERCISES: CPT | Mod: GP,CQ

## 2024-07-15 ASSESSMENT — PAIN - FUNCTIONAL ASSESSMENT: PAIN_FUNCTIONAL_ASSESSMENT: 0-10

## 2024-07-15 ASSESSMENT — PAIN SCALES - GENERAL: PAINLEVEL_OUTOF10: 4

## 2024-07-15 NOTE — PROGRESS NOTES
"Physical Therapy Treatment    Patient Name: Buzz Chatman  MRN: [id number]  Today's Date: 7/15/2024  Time Calculation  Start Time: 0745  Stop Time: 0826  Time Calculation (min): 41 min  PT Therapeutic Procedures Time Entry  Therapeutic Exercise Time Entry: 39       Assessment:   Patient rosio's antalgic gait with initial ambulation, but is able to improve gait mechanics with increased ambulation this date. Patient tolerates new PRE's with mild difficulty, but no c/o increased symptoms. Patient rosio's challenges with SLR. HEP updated and reviewed with patient rosio'dot good understanding.     Plan:  Continue to work on improving strength and decreasing pain to be able to tolerate prolonged ambulation with little to no difficulty. -AB.    OP PT Plan  Treatment/Interventions: Aquatic therapy, Cryotherapy, Education/ Instruction, Electrical stimulation, Gait training, Hot pack, Manual therapy, Neuromuscular re-education, Self care/ home management, Therapeutic exercises, Other (comment) (IASTM/cupping)  PT Plan: Skilled PT  PT Frequency: 2 times per week  Duration: 6wks  Onset Date: 05/27/24  Rehab Potential: Good  Plan of Care Agreement: Patient    Current Problem  Problem List Items Addressed This Visit             ICD-10-CM    Knee pain, left M25.562       Subjective   General  Reason for Referral: knee pain L  Referred By: Torrie  General Comment: 3/9  HEP: Yes  Patient states that he's been trying to use the stairs and work his knee at home. Patient states that he stiffens up when he sits.     Precautions  Precautions  Braces Applied: L knee soft LB; x2 crutches  Precautions Comment: mod fall risk; WBAT    Pain  Pain Assessment: 0-10  0-10 (Numeric) Pain Score: 4  Pain Location: Knee  Pain Orientation: Left    Objective     Treatments:  Therapeutic Exercise: 39 minutes, 3 units  SciFit x6' (P, time)   Slantboard 2x1'   Step ups 6\" step x15 Fwd/Lateral L leading (P, reps)  Standing heel raises 2x10 Bilat (N)  Standing " "hip abduction x10 Bilat (N)  Standing hip extension x10 Bilat (N)  Standing hip flexion x10 Bilat (N)  LAQ's 2x10 L leading 2# (P, weight)   HS curl 2x10 L Green Tband   Standing TKE 2x10 Blue Tband L   Seated HS stretch 3x30\" L   SLR x10 L (N)    OP EDUCATION:   Access Code: [email or web address]  URL: [email or web address]  Date: 07/15/2024  Prepared by: Lillian Santizo    Exercises  - Supine Active Straight Leg Raise  - 1 x daily - 7 x weekly - 3 sets - 10 reps  - Seated Hamstring Stretch  - 1 x daily - 7 x weekly - 3 reps - 30 hold  - Seated Long Arc Quad  - 1 x daily - 7 x weekly - 3 sets - 10 reps  - Standing Hip Abduction with Counter Support  - 1 x daily - 7 x weekly - 3 sets - 10 reps  - Standing Hip Extension with Counter Support  - 1 x daily - 7 x weekly - 3 sets - 10 reps  - Step Up  - 1 x daily - 7 x weekly - 3 sets - 10 reps  - Standing Hip Flexion AROM  - 1 x daily - 7 x weekly - 3 sets - 10 reps    Goals:  Active       PT Problem       PT Goal 1       Start:  06/27/24    Expected End:  09/25/24       1. Independent HEP to allow for 50% reduction in max ADL C/C sx ( 7/10) 2-3wks  2. 0/10 night time sx to allow for uninterrupted sleep x 1wk ( 7/7) 2-3wks  3. Survey score improvement from 26/80 to 40/80 (LEFS) 4-6wks  4. Strength increase to allow for improved ADL gait on flats/stairs (from 3+/5 to 4+/5 L knee) 4-6wks  5. ROM increase to allow for improved ADL dressing lowers (from 35 to 15 ative flexion) 4-6wks           PT Goal 2       Start:  06/27/24    Expected End:  09/25/24       1. \"I want my  knee  to feel better\".             "

## 2024-07-18 ENCOUNTER — TREATMENT (OUTPATIENT)
Dept: PHYSICAL THERAPY | Facility: CLINIC | Age: 24
End: 2024-07-18
Payer: COMMERCIAL

## 2024-07-18 DIAGNOSIS — M25.562 KNEE PAIN, LEFT: ICD-10-CM

## 2024-07-18 PROCEDURE — 97110 THERAPEUTIC EXERCISES: CPT | Mod: GP,CQ

## 2024-07-18 ASSESSMENT — PAIN SCALES - GENERAL: PAINLEVEL_OUTOF10: 3

## 2024-07-18 ASSESSMENT — PAIN - FUNCTIONAL ASSESSMENT: PAIN_FUNCTIONAL_ASSESSMENT: 0-10

## 2024-07-18 NOTE — PROGRESS NOTES
"Physical Therapy Treatment    Patient Name: Buzz Chatman  MRN: [id number]  Today's Date: 2024  Time Calculation  Start Time: 08  Stop Time: 915  Time Calculation (min): 40 min    General  Reason for Referral: knee pain L  Referred By: Torrie  General Comment:     Assessment:   Patient identified with name and .   Good tolerance to session without increase in pain level. Occasional instruction for gt pattern to reduce deviation. Challenged with current ex program but patient progressing towards rehab goals.     Plan:  Continue to work on improving strength and decreasing pain to be able to tolerate prolonged ambulation with little to no difficulty.     OP PT Plan  Treatment/Interventions: Aquatic therapy, Cryotherapy, Education/ Instruction, Electrical stimulation, Gait training, Hot pack, Manual therapy, Neuromuscular re-education, Self care/ home management, Therapeutic exercises, Other (comment) (IASTM/cupping)  PT Plan: Skilled PT  PT Frequency: 2 times per week  Duration: 6wks  Onset Date: 24  Rehab Potential: Good  Plan of Care Agreement: Patient    Current Problem  Problem List Items Addressed This Visit             ICD-10-CM    Knee pain, left M25.562       Subjective   Patient reported slightly less pain as compared to the previous session. Stated he is trying to work on walking without a limp.     Precautions  Precautions  Braces Applied: L knee soft LB; x2 crutches  Precautions Comment: mod fall risk; WBAT    Pain  Pain Assessment: 0-10  0-10 (Numeric) Pain Score: 3  Pain Location: Knee  Pain Orientation: Left    Objective:  Therapeutic Exercise: 39 minutes, 3 units  SciFit x6'    Slantboard 2x1'   Step ups 6\" step 2x15 Fwd/Lateral L leading   Standing heel raises 2x10 Bilat   Standing hip abduction 2x10 Bilat   Standing hip extension x10 Bilat   Standing hip flexion x10 Bilat   LAQ's 2x10 L leading 2#    HS curl 2x10 L Green Tband   Standing TKE 2x10 Blue Tband L   Seated HS stretch " "3x30\" L   SLR x10 L (N)       OP EDUCATION:    Access Code: [email or web address]  URL: [email or web address]  Date: 07/15/2024  Prepared by: Lillian Santizo     Exercises  - Supine Active Straight Leg Raise  - 1 x daily - 7 x weekly - 3 sets - 10 reps  - Seated Hamstring Stretch  - 1 x daily - 7 x weekly - 3 reps - 30 hold  - Seated Long Arc Quad  - 1 x daily - 7 x weekly - 3 sets - 10 reps  - Standing Hip Abduction with Counter Support  - 1 x daily - 7 x weekly - 3 sets - 10 reps  - Standing Hip Extension with Counter Support  - 1 x daily - 7 x weekly - 3 sets - 10 reps  - Step Up  - 1 x daily - 7 x weekly - 3 sets - 10 reps  - Standing Hip Flexion AROM  - 1 x daily - 7 x weekly - 3 sets - 10 reps       Goals:  Active       PT Problem       PT Goal 1       Start:  06/27/24    Expected End:  09/25/24       1. Independent HEP to allow for 50% reduction in max ADL C/C sx ( 7/10) 2-3wks  2. 0/10 night time sx to allow for uninterrupted sleep x 1wk ( 7/7) 2-3wks  3. Survey score improvement from 26/80 to 40/80 (LEFS) 4-6wks  4. Strength increase to allow for improved ADL gait on flats/stairs (from 3+/5 to 4+/5 L knee) 4-6wks  5. ROM increase to allow for improved ADL dressing lowers (from 35 to 15 ative flexion) 4-6wks           PT Goal 2       Start:  06/27/24    Expected End:  09/25/24       1. \"I want my  knee  to feel better\".             "

## 2024-07-22 ENCOUNTER — APPOINTMENT (OUTPATIENT)
Dept: PHYSICAL THERAPY | Facility: CLINIC | Age: 24
End: 2024-07-22
Payer: COMMERCIAL

## 2024-07-24 ENCOUNTER — TREATMENT (OUTPATIENT)
Dept: PHYSICAL THERAPY | Facility: CLINIC | Age: 24
End: 2024-07-24
Payer: COMMERCIAL

## 2024-07-24 DIAGNOSIS — M25.562 KNEE PAIN, LEFT: ICD-10-CM

## 2024-07-24 PROCEDURE — 97110 THERAPEUTIC EXERCISES: CPT | Mod: GP,CQ

## 2024-07-24 ASSESSMENT — PAIN - FUNCTIONAL ASSESSMENT: PAIN_FUNCTIONAL_ASSESSMENT: 0-10

## 2024-07-24 ASSESSMENT — PAIN SCALES - GENERAL: PAINLEVEL_OUTOF10: 3

## 2024-07-24 NOTE — PROGRESS NOTES
"Physical Therapy Treatment    Patient Name: Buzz Chatman  MRN: [id number]  Today's Date: 7/24/2024  Time Calculation  Start Time: 0831  Stop Time: 0917  Time Calculation (min): 46 min  PT Therapeutic Procedures Time Entry  Therapeutic Exercise Time Entry: 42       Assessment:   Patient rosio's challenges with standing PRE's. Patient able to progress step height with patient rosio'g fatigue. Demo's good concentric/eccentric motions throughout session. Demo's fatigue at end of session.     Plan:  Continue to work on improving strength and decreasing pain to be able to get a full nights sleep uninterrupted. -AB.    OP PT Plan  Treatment/Interventions: Aquatic therapy, Cryotherapy, Education/ Instruction, Electrical stimulation, Gait training, Hot pack, Manual therapy, Neuromuscular re-education, Self care/ home management, Therapeutic exercises, Other (comment) (IASTM/cupping)  PT Plan: Skilled PT  PT Frequency: 2 times per week  Duration: 6wks  Onset Date: 05/27/24  Rehab Potential: Good  Plan of Care Agreement: Patient    Current Problem  Problem List Items Addressed This Visit             ICD-10-CM    Knee pain, left M25.562       Subjective   General  Reason for Referral: knee pain L  Referred By: Torrie  General Comment: 5/9  HEP: Yes  Patient states that his pain is still a 3/10 this date. Patient states that he is compliant with HEP.     Precautions  Precautions  Braces Applied: L knee soft LB; x2 crutches  Precautions Comment: mod fall risk; WBAT    Pain  Pain Assessment: 0-10  0-10 (Numeric) Pain Score: 3  Pain Location: Knee  Pain Orientation: Left    Objective     Treatments:  Therapeutic Exercise: 42 minutes, 3 units  Recumbent bike x6' (N)  SciFit x6' (X)  Slantboard 2x1'   Step ups 12\" step 2x15 Fwd/Lateral L leading (P, surface)  Standing heel raises 2x10 Bilat   Standing hip abduction 2x10 Bilat   Standing hip extension 2x10 Bilat (P, reps)   Standing hip flexion 2x10 Bilat (P, reps)    Not today: " "7-24-24   LAQ's 2x10 L leading 2#    HS curl 2x10 L Green Tband   Standing TKE 2x10 Blue Tband L   Seated HS stretch 3x30\" L   SLR x10 L     OP EDUCATION:   Access Code: [email or web address]  URL: [email or web address]  Date: 07/15/2024  Prepared by: Lillian Santizo     Exercises  - Supine Active Straight Leg Raise  - 1 x daily - 7 x weekly - 3 sets - 10 reps  - Seated Hamstring Stretch  - 1 x daily - 7 x weekly - 3 reps - 30 hold  - Seated Long Arc Quad  - 1 x daily - 7 x weekly - 3 sets - 10 reps  - Standing Hip Abduction with Counter Support  - 1 x daily - 7 x weekly - 3 sets - 10 reps  - Standing Hip Extension with Counter Support  - 1 x daily - 7 x weekly - 3 sets - 10 reps  - Step Up  - 1 x daily - 7 x weekly - 3 sets - 10 reps  - Standing Hip Flexion AROM  - 1 x daily - 7 x weekly - 3 sets - 10 reps    Goals:  Active       PT Problem       PT Goal 1       Start:  06/27/24    Expected End:  09/25/24       1. Independent HEP to allow for 50% reduction in max ADL C/C sx ( 7/10) 2-3wks  2. 0/10 night time sx to allow for uninterrupted sleep x 1wk ( 7/7) 2-3wks  3. Survey score improvement from 26/80 to 40/80 (LEFS) 4-6wks  4. Strength increase to allow for improved ADL gait on flats/stairs (from 3+/5 to 4+/5 L knee) 4-6wks  5. ROM increase to allow for improved ADL dressing lowers (from 35 to 15 ative flexion) 4-6wks           PT Goal 2       Start:  06/27/24    Expected End:  09/25/24       1. \"I want my  knee  to feel better\".             "

## 2024-07-29 ENCOUNTER — TREATMENT (OUTPATIENT)
Dept: PHYSICAL THERAPY | Facility: CLINIC | Age: 24
End: 2024-07-29
Payer: COMMERCIAL

## 2024-07-29 DIAGNOSIS — M25.562 KNEE PAIN, LEFT: ICD-10-CM

## 2024-07-29 PROCEDURE — 97110 THERAPEUTIC EXERCISES: CPT | Mod: GP,CQ

## 2024-07-29 ASSESSMENT — PAIN SCALES - GENERAL: PAINLEVEL_OUTOF10: 3

## 2024-07-29 ASSESSMENT — PAIN - FUNCTIONAL ASSESSMENT: PAIN_FUNCTIONAL_ASSESSMENT: 0-10

## 2024-07-29 NOTE — PROGRESS NOTES
"Physical Therapy Treatment    Patient Name: Buzz Chatman  MRN: [id number]  Today's Date: 2024  Time Calculation  Start Time: 832  Stop Time: 914  Time Calculation (min): 42 min    General  Reason for Referral: knee pain L  Referred By: Torrie  General Comment:     Assessment:   Patient identified with name and .   Continued therex to increase functional strength and mobility in LLE. Advancing as indicated to continue goal progression. He denies increase in pain, only mild fatigue, after session.     Plan:  Continue to work on improving strength and decreasing pain to be able to get a full nights sleep uninterrupted.     OP PT Plan  Treatment/Interventions: Aquatic therapy, Cryotherapy, Education/ Instruction, Electrical stimulation, Gait training, Hot pack, Manual therapy, Neuromuscular re-education, Self care/ home management, Therapeutic exercises, Other (comment) (IASTM/cupping)  PT Plan: Skilled PT  PT Frequency: 2 times per week  Duration: 6wks  Onset Date: 24  Rehab Potential: Good  Plan of Care Agreement: Patient    Current Problem  Problem List Items Addressed This Visit             ICD-10-CM    Knee pain, left M25.562       Subjective   Patient stated his knee about the same pain-wise but slowly getting stronger.     Precautions  Precautions  Braces Applied: L knee soft LB; x2 crutches  Precautions Comment: mod fall risk; WBAT    Pain  Pain Assessment: 0-10  0-10 (Numeric) Pain Score: 3  Pain Location: Knee  Pain Orientation: Left    Objective:  Therapeutic Exercise: 40 minutes, 3 units  Recumbent bike x6' (X - in use)  SciFit x6'   Slantboard 2x1'   Step ups 12\" step 2x15 Fwd/Lateral L leading   Standing heel raises 2x10 Bilat   Standing hip abduction 2x10 Bilat   Standing hip extension 2x10 Bilat    Standing hip flexion 2x10 Bilat   Standing heel raises: 2x10 Bilat  Partial squats: 2x10  [N]  LAQ's 2x10 L leading 3#  [P]  HS curl 2x10 L Green Tband   Standing TKE 2x10 Blue Tband L " "    Not Today:  Seated HS stretch 3x30\" L   SLR x10 L        OP EDUCATION:    Access Code: [email or web address]  URL: [email or web address]  Date: 07/15/2024  Prepared by: Lillian Santizo     Exercises  - Supine Active Straight Leg Raise  - 1 x daily - 7 x weekly - 3 sets - 10 reps  - Seated Hamstring Stretch  - 1 x daily - 7 x weekly - 3 reps - 30 hold  - Seated Long Arc Quad  - 1 x daily - 7 x weekly - 3 sets - 10 reps  - Standing Hip Abduction with Counter Support  - 1 x daily - 7 x weekly - 3 sets - 10 reps  - Standing Hip Extension with Counter Support  - 1 x daily - 7 x weekly - 3 sets - 10 reps  - Step Up  - 1 x daily - 7 x weekly - 3 sets - 10 reps  - Standing Hip Flexion AROM  - 1 x daily - 7 x weekly - 3 sets - 10 reps       Goals:  Active       PT Problem       PT Goal 1       Start:  06/27/24    Expected End:  09/25/24       1. Independent HEP to allow for 50% reduction in max ADL C/C sx ( 7/10) 2-3wks  2. 0/10 night time sx to allow for uninterrupted sleep x 1wk ( 7/7) 2-3wks  3. Survey score improvement from 26/80 to 40/80 (LEFS) 4-6wks  4. Strength increase to allow for improved ADL gait on flats/stairs (from 3+/5 to 4+/5 L knee) 4-6wks  5. ROM increase to allow for improved ADL dressing lowers (from 35 to 15 ative flexion) 4-6wks           PT Goal 2       Start:  06/27/24    Expected End:  09/25/24       1. \"I want my  knee  to feel better\".             "

## 2024-07-31 ENCOUNTER — TREATMENT (OUTPATIENT)
Dept: PHYSICAL THERAPY | Facility: CLINIC | Age: 24
End: 2024-07-31
Payer: COMMERCIAL

## 2024-07-31 DIAGNOSIS — M25.562 KNEE PAIN, LEFT: ICD-10-CM

## 2024-07-31 PROCEDURE — 97110 THERAPEUTIC EXERCISES: CPT | Mod: GP

## 2024-07-31 ASSESSMENT — PAIN SCALES - GENERAL: PAINLEVEL_OUTOF10: 0 - NO PAIN

## 2024-07-31 ASSESSMENT — PAIN - FUNCTIONAL ASSESSMENT: PAIN_FUNCTIONAL_ASSESSMENT: 0-10

## 2024-07-31 NOTE — PROGRESS NOTES
"Physical Therapy Treatment    Patient Name: Buzz Chatman  MRN: [id number]  Today's Date: 2024  Time Calculation  Start Time: 907  Stop Time: 945  Time Calculation (min): 38 min      PT Therapeutic Procedures Time Entry  Therapeutic Exercise Time Entry: 38                         General:  General  Reason for Referral: knee pain L  Referred By: Torrie  General Comment:       Current Problem  Problem List Items Addressed This Visit             ICD-10-CM    Knee pain, left M25.562         Assessment:Patient identity confirmed today with name/. ;  ( 0 ) falls since last clinic visit; neut to 115 active flexion today;  4+/5 L knee flex/ext;  slight L hip stance compensation today;  modified/added to clinic/HEP today;       Plan:  Treatment/Interventions: Aquatic therapy, Cryotherapy, Education/ Instruction, Electrical stimulation, Gait training, Hot pack, Manual therapy, Neuromuscular re-education, Self care/ home management, Therapeutic exercises, Other (comment) (IASTM/cupping)  PT Plan: Skilled PT  PT Frequency: 2 times per week  Duration: 6wks  Onset Date: 24  Rehab Potential: Good  Plan of Care Agreement: Patient  Continue with clinic rehab treatments toward functional goals ( normal gait)    Subjective: I have   0/10 pain right now.         Precautions  Precautions  Braces Applied: L knee soft LB; x2 crutches  Precautions Comment: mod fall risk; WBAT      Pain  Pain Assessment: 0-10  0-10 (Numeric) Pain Score: 0 - No pain  Pain Location: Knee  Pain Orientation: Left    Objective    Manual:___0__min    There ex:__38___min  Recumbent bike x6' X  SciFit x6'   Heel slide with strap 10 x 3 count hold  Seated HSC 2x10 (manual today)  Seated LAQ 10 x 3 count hold  Standing TKE 2x10 purple Tband L P  Partial squats: 2x10    BHR 2x10  Slantboard x1'   Step ups 6\" step x01 Fwd/Lateral L leading   heel taps 2x10 6\"  SLS L 2x20\" on airex  Standing hip abduction 2x10 Bilat X  Standing hip extension 2x10 " "Bilat  X  Standing hip flexion 2x10 Bilat X       Not Today:  Seated HS stretch 3x30\" L   SLR x10 L        OP EDUCATION:    Access Code: [email or web address]  URL: [email or web address]  Date: 07/15/2024  Prepared by: Lillian Santizo     Exercises  - Supine Active Straight Leg Raise  - 1 x daily - 7 x weekly - 3 sets - 10 reps  - Seated Hamstring Stretch  - 1 x daily - 7 x weekly - 3 reps - 30 hold  - Seated Long Arc Quad  - 1 x daily - 7 x weekly - 3 sets - 10 reps  - Standing Hip Abduction with Counter Support  - 1 x daily - 7 x weekly - 3 sets - 10 reps  - Standing Hip Extension with Counter Support  - 1 x daily - 7 x weekly - 3 sets - 10 reps  - Step Up  - 1 x daily - 7 x weekly - 3 sets - 10 reps  - Standing Hip Flexion AROM  - 1 x daily - 7 x weekly - 3 sets - 10 reps       OP EDUCATION:  Access Code: [email or web address]  URL: [email or web address]/  Date: 07/31/2024  Prepared by: Matthias Little    Exercises  - Sitting Heel Slide with Towel (Mirrored)   - Seated Long Arc Quad (Mirrored)   - Seated Hamstring Curl with Anchored Resistance (Mirrored)   - Standing Terminal Knee Extension with Resistance (Mirrored)   - Step Up   - Lateral Step Ups (Mirrored)   - Lateral Step Down   - Standing Single Leg Stance with Counter Support (Mirrored)   - Heel Raise   - Standing Gastroc Stretch on Step with Counter Support     Goals:  Active       PT Problem       PT Goal 1       Start:  06/27/24    Expected End:  09/25/24       1. Independent HEP to allow for 50% reduction in max ADL C/C sx ( 7/10) 2-3wks  2. 0/10 night time sx to allow for uninterrupted sleep x 1wk ( 7/7) 2-3wks  3. Survey score improvement from 26/80 to 40/80 (LEFS) 4-6wks  4. Strength increase to allow for improved ADL gait on flats/stairs (from 3+/5 to 4+/5 L knee) 4-6wks  5. ROM increase to allow for improved ADL dressing lowers (from 35 to 115 active flexion) 4-6wks           PT Goal 2       Start:  06/27/24    Expected End:  " "09/25/24       1. \"I want my  knee  to feel better\".             "

## 2024-08-05 ENCOUNTER — TREATMENT (OUTPATIENT)
Dept: PHYSICAL THERAPY | Facility: CLINIC | Age: 24
End: 2024-08-05
Payer: COMMERCIAL

## 2024-08-05 DIAGNOSIS — M25.562 KNEE PAIN, LEFT: ICD-10-CM

## 2024-08-05 PROCEDURE — 97110 THERAPEUTIC EXERCISES: CPT | Mod: GP,CQ

## 2024-08-05 ASSESSMENT — PAIN SCALES - GENERAL: PAINLEVEL_OUTOF10: 0 - NO PAIN

## 2024-08-05 ASSESSMENT — PAIN - FUNCTIONAL ASSESSMENT: PAIN_FUNCTIONAL_ASSESSMENT: 0-10

## 2024-08-05 NOTE — PROGRESS NOTES
"Physical Therapy Treatment    Patient Name: Buzz Chatman  MRN: [id number]  Today's Date: 2024  Time Calculation  Start Time: 0845  Stop Time: 916  Time Calculation (min): 31 min    General:  General  Reason for Referral: knee pain L  Referred By: Torrie  General Comment:      Assessment:   Patient identified with name and .   Unable to complete all ex's today as patient 15 minutes late for scheduled visit. Continued therex for strength and mobility in LLE. Added [employer] Masters Leg Press with good tolerance. Fatigued after therex but patient denies any pain.    Plan:  Continue with clinic rehab treatments toward functional goals ( normal gait)     OP PT Plan  Treatment/Interventions: Aquatic therapy, Cryotherapy, Education/ Instruction, Electrical stimulation, Gait training, Hot pack, Manual therapy, Neuromuscular re-education, Self care/ home management, Therapeutic exercises, Other (comment) (IASTM/cupping)  PT Plan: Skilled PT  PT Frequency: 2 times per week  Duration: 6wks  Onset Date: 24  Rehab Potential: Good  Plan of Care Agreement: Patient    Current Problem  Problem List Items Addressed This Visit             ICD-10-CM    Knee pain, left M25.562       Subjective   No pain today. Just a little tired after some of the ex's.     Precautions  Precautions  Braces Applied: L knee soft LB; x2 crutches  Precautions Comment: mod fall risk; WBAT    Pain  Pain Assessment: 0-10  0-10 (Numeric) Pain Score: 0 - No pain  Pain Location: Knee  Pain Orientation: Left    Objective     Manual:___0__min     There ex:__30___min  Recumbent bike x6' X  SciFit x6'   Heel slide with strap 10 x 3 count hold X  Seated HSC 2x10 (manual today) X  Seated LAQ 10 x 3 count hold X  Standing TKE 2x10 purple Tband L X  Partial squats: 2x10    BHR 2x10  Slantboard x1'   Step ups 6\" step x01 Fwd/Lateral L leading   heel taps 2x10 6\"  SLS L 2x20\" on airex  Leg Press: B 120#, 2x10 ;  L 60#, 1x10  Standing hip abduction 2x10 Bilat " "X  Standing hip extension 2x10 Bilat  X  Standing hip flexion 2x10 Bilat X         Not Today:  Seated HS stretch 3x30\" L   SLR x10 L     OP EDUCATION:   Access Code: [email or web address]  URL: [email or web address]  Date: 07/15/2024  Prepared by: Lillian Santizo     Exercises  - Supine Active Straight Leg Raise  - 1 x daily - 7 x weekly - 3 sets - 10 reps  - Seated Hamstring Stretch  - 1 x daily - 7 x weekly - 3 reps - 30 hold  - Seated Long Arc Quad  - 1 x daily - 7 x weekly - 3 sets - 10 reps  - Standing Hip Abduction with Counter Support  - 1 x daily - 7 x weekly - 3 sets - 10 reps  - Standing Hip Extension with Counter Support  - 1 x daily - 7 x weekly - 3 sets - 10 reps  - Step Up  - 1 x daily - 7 x weekly - 3 sets - 10 reps  - Standing Hip Flexion AROM  - 1 x daily - 7 x weekly - 3 sets - 10 reps        OP EDUCATION:  Access Code: [email or web address]  URL: [email or web address]  Date: 07/31/2024  Prepared by: Matthias Little     Exercises  - Sitting Heel Slide with Towel (Mirrored)   - Seated Long Arc Quad (Mirrored)   - Seated Hamstring Curl with Anchored Resistance (Mirrored)   - Standing Terminal Knee Extension with Resistance (Mirrored)   - Step Up   - Lateral Step Ups (Mirrored)   - Lateral Step Down   - Standing Single Leg Stance with Counter Support (Mirrored)   - Heel Raise   - Standing Gastroc Stretch on Step with Counter Support        Goals:  Active       PT Problem       PT Goal 1       Start:  06/27/24    Expected End:  09/25/24       1. Independent HEP to allow for 50% reduction in max ADL C/C sx ( 7/10) 2-3wks  2. 0/10 night time sx to allow for uninterrupted sleep x 1wk ( 7/7) 2-3wks  3. Survey score improvement from 26/80 to 40/80 (LEFS) 4-6wks  4. Strength increase to allow for improved ADL gait on flats/stairs (from 3+/5 to 4+/5 L knee) 4-6wks  5. ROM increase to allow for improved ADL dressing lowers (from 35 to 115 active flexion) 4-6wks           PT Goal 2       Start: " " 06/27/24    Expected End:  09/25/24       1. \"I want my  knee  to feel better\".             "

## 2024-08-07 ENCOUNTER — TREATMENT (OUTPATIENT)
Dept: PHYSICAL THERAPY | Facility: CLINIC | Age: 24
End: 2024-08-07
Payer: COMMERCIAL

## 2024-08-07 DIAGNOSIS — M25.562 KNEE PAIN, LEFT: Primary | ICD-10-CM

## 2024-08-07 PROCEDURE — 97110 THERAPEUTIC EXERCISES: CPT | Mod: GP

## 2024-08-07 ASSESSMENT — PAIN - FUNCTIONAL ASSESSMENT: PAIN_FUNCTIONAL_ASSESSMENT: 0-10

## 2024-08-07 ASSESSMENT — PAIN SCALES - GENERAL: PAINLEVEL_OUTOF10: 0 - NO PAIN

## 2024-08-07 NOTE — PROGRESS NOTES
"Physical Therapy Treatment    Patient Name: Buzz Chatman  MRN: [id number]  Today's Date: 2024  Time Calculation  Start Time: 837  Stop Time: 916  Time Calculation (min): 39 min      PT Therapeutic Procedures Time Entry  Therapeutic Exercise Time Entry: 38                         General:  General  Reason for Referral: knee pain L  Referred By: Torrie  General Comment:       Current Problem  Problem List Items Addressed This Visit             ICD-10-CM    Knee pain, left - Primary M25.562         Assessment:Patient identity confirmed today with name/. ;  ( 0 ) falls since last clinic visit; normal gait at normal speeds today;  no C/O pain with todays exercises      Plan:  Treatment/Interventions: Aquatic therapy, Cryotherapy, Education/ Instruction, Electrical stimulation, Gait training, Hot pack, Manual therapy, Neuromuscular re-education, Self care/ home management, Therapeutic exercises, Other (comment) (IASTM/cupping)  PT Plan: Skilled PT  PT Frequency: 2 times per week  Duration: 6wks  Onset Date: 24  Rehab Potential: Good  Plan of Care Agreement: Patient  Continue with clinic rehab treatments toward functional goals ( normal ambulation)    Subjective: I have  0 /10 pain right now.         Precautions  Precautions  Braces Applied: L knee soft LB; x2 crutches  Precautions Comment: mod fall risk; WBAT      Pain  Pain Assessment: 0-10  0-10 (Numeric) Pain Score: 0 - No pain  Pain Location: Knee  Pain Orientation: Left    Objective    Manual:_____min    There ex:__38___min  Recumbent bike x6' X  SciFit x6' X  Nu-step 5' Lv 4  Heel slide with strap 10 x 3 count hold X  Seated HSC 2x10 (manual today)   Seated LAQ 10 x 3 count hold X  Standing TKE 2x10 purple Tband L X  Partial squats: 2x10    BHR 2x10  Slantboard x1'   Step ups 6\" step 2x10 Fwd  X10 Lateral L leading   Fwd step-ups 2x10 12\" N  heel taps 2x10 6\"  SLS L 2x20\" on airex  Leg Press: B 120#, 2x10 X   L 2x10 115# 2x10  Standing hip " "abduction 2x10 Bilat X  Standing hip extension 2x10 Bilat  X  Standing hip flexion 2x10 Bilat X         Not Today:  Seated HS stretch 3x30\" L   SLR x10 L     OP EDUCATION:   Access Code: [email or web address]  URL: [email or web address]  Date: 07/15/2024  Prepared by: Lillian Santizo     Exercises  - Supine Active Straight Leg Raise  - 1 x daily - 7 x weekly - 3 sets - 10 reps  - Seated Hamstring Stretch  - 1 x daily - 7 x weekly - 3 reps - 30 hold  - Seated Long Arc Quad  - 1 x daily - 7 x weekly - 3 sets - 10 reps  - Standing Hip Abduction with Counter Support  - 1 x daily - 7 x weekly - 3 sets - 10 reps  - Standing Hip Extension with Counter Support  - 1 x daily - 7 x weekly - 3 sets - 10 reps  - Step Up  - 1 x daily - 7 x weekly - 3 sets - 10 reps  - Standing Hip Flexion AROM  - 1 x daily - 7 x weekly - 3 sets - 10 reps        OP EDUCATION:  Access Code: [email or web address]  URL: [email or web address]  Date: 07/31/2024  Prepared by: Matthias Little     Exercises  - Sitting Heel Slide with Towel (Mirrored)   - Seated Long Arc Quad (Mirrored)   - Seated Hamstring Curl with Anchored Resistance (Mirrored)   - Standing Terminal Knee Extension with Resistance (Mirrored)   - Step Up   - Lateral Step Ups (Mirrored)   - Lateral Step Down   - Standing Single Leg Stance with Counter Support (Mirrored)   - Heel Raise   - Standing Gastroc Stretch on Step with Counter Support        OP EDUCATION:      Goals:  Active       PT Problem       PT Goal 1       Start:  06/27/24    Expected End:  09/25/24       1. Independent HEP to allow for 50% reduction in max ADL C/C sx ( 7/10) 2-3wks  2. 0/10 night time sx to allow for uninterrupted sleep x 1wk ( 7/7) 2-3wks  3. Survey score improvement from 26/80 to 40/80 (LEFS) 4-6wks  4. Strength increase to allow for improved ADL gait on flats/stairs (from 3+/5 to 4+/5 L knee) 4-6wks  5. ROM increase to allow for improved ADL dressing lowers (from 35 to 115 active flexion) " "4-6wks           PT Goal 2       Start:  06/27/24    Expected End:  09/25/24       1. \"I want my  knee  to feel better\".             "

## 2024-08-12 ENCOUNTER — TREATMENT (OUTPATIENT)
Dept: PHYSICAL THERAPY | Facility: CLINIC | Age: 24
End: 2024-08-12
Payer: COMMERCIAL

## 2024-08-12 DIAGNOSIS — M25.562 KNEE PAIN, LEFT: ICD-10-CM

## 2024-08-12 PROCEDURE — 97110 THERAPEUTIC EXERCISES: CPT | Mod: GP,CQ

## 2024-08-12 ASSESSMENT — PAIN - FUNCTIONAL ASSESSMENT: PAIN_FUNCTIONAL_ASSESSMENT: 0-10

## 2024-08-12 ASSESSMENT — PAIN SCALES - GENERAL: PAINLEVEL_OUTOF10: 0 - NO PAIN

## 2024-08-12 NOTE — PROGRESS NOTES
"Physical Therapy Treatment    Patient Name: Buzz Chatman  MRN: [id number]  Today's Date: 2024  Time Calculation  Start Time: 837  Stop Time: 917  Time Calculation (min): 40 min    General:  Reason for Referral: knee pain L  Referred By: Torrie Heath Comment: 10/12    Assessment:   Patient identified with name and .   Good tolerance to therex with mild fatigue afterwards but patient denies pain. Progressing well towards PT goals and he'll meet with evaluating PT next session for reassessment.     Plan:  Continue with clinic rehab treatments toward functional goals ( normal ambulation)     OP PT Plan  Treatment/Interventions: Aquatic therapy, Cryotherapy, Education/ Instruction, Electrical stimulation, Gait training, Hot pack, Manual therapy, Neuromuscular re-education, Self care/ home management, Therapeutic exercises, Other (comment) (IASTM/cupping)  PT Plan: Skilled PT  PT Frequency: 2 times per week  Duration: 6wks  Onset Date: 24  Rehab Potential: Good  Plan of Care Agreement: Patient    Current Problem  Problem List Items Addressed This Visit             ICD-10-CM    Knee pain, left M25.562       Subjective   Patient stated his knee is slowly getting stronger and he's anxious to get back to work.     Precautions  Precautions  Braces Applied: L knee soft LB; x2 crutches  Precautions Comment: mod fall risk; WBAT    Pain  Pain Assessment: 0-10  0-10 (Numeric) Pain Score: 0 - No pain  Pain Location: Knee  Pain Orientation: Left    Objective    There ex:__38___min  Recumbent bike x6' X  SciFit x6'   Nu-step 5' Lv 4 X  Heel slide with strap 10 x 3 count hold X  Seated HSC 2x10 (manual today)   Seated LAQ 10 x 3 count hold X  Standing TKE 2x10 purple Tband L X  Partial squats: 2x10    BHR 2x10  Slantboard x1'   Step ups 6\" step 2x10 Fwd X  X10 Lateral L leading   Fwd step-ups 2x10 12\"   heel taps 2x10 6\"  SLS L 2x20\" on airex (knee unlocked)  Leg Press: B 120#, 2x10 X   L 2x10 115# 2x10  Standing " "hip abduction 2x10 Bilat X  Standing hip extension 2x10 Bilat  X  Standing hip flexion 2x10 Bilat X         Not Today:  Seated HS stretch 3x30\" L   SLR x10 L     OP EDUCATION:    Access Code: [email or web address]  URL: [email or web address]  Date: 07/15/2024  Prepared by: Lillian Santizo     Exercises  - Supine Active Straight Leg Raise  - 1 x daily - 7 x weekly - 3 sets - 10 reps  - Seated Hamstring Stretch  - 1 x daily - 7 x weekly - 3 reps - 30 hold  - Seated Long Arc Quad  - 1 x daily - 7 x weekly - 3 sets - 10 reps  - Standing Hip Abduction with Counter Support  - 1 x daily - 7 x weekly - 3 sets - 10 reps  - Standing Hip Extension with Counter Support  - 1 x daily - 7 x weekly - 3 sets - 10 reps  - Step Up  - 1 x daily - 7 x weekly - 3 sets - 10 reps  - Standing Hip Flexion AROM  - 1 x daily - 7 x weekly - 3 sets - 10 reps       Access Code: [email or web address]  URL: [email or web address]  Date: 07/31/2024  Prepared by: Matthias Little     Exercises  - Sitting Heel Slide with Towel (Mirrored)   - Seated Long Arc Quad (Mirrored)   - Seated Hamstring Curl with Anchored Resistance (Mirrored)   - Standing Terminal Knee Extension with Resistance (Mirrored)   - Step Up   - Lateral Step Ups (Mirrored)   - Lateral Step Down   - Standing Single Leg Stance with Counter Support (Mirrored)   - Heel Raise   - Standing Gastroc Stretch on Step with Counter Support        Goals:  Active       PT Problem       PT Goal 1       Start:  06/27/24    Expected End:  09/25/24       1. Independent HEP to allow for 50% reduction in max ADL C/C sx ( 7/10) 2-3wks  2. 0/10 night time sx to allow for uninterrupted sleep x 1wk ( 7/7) 2-3wks  3. Survey score improvement from 26/80 to 40/80 (LEFS) 4-6wks  4. Strength increase to allow for improved ADL gait on flats/stairs (from 3+/5 to 4+/5 L knee) 4-6wks  5. ROM increase to allow for improved ADL dressing lowers (from 35 to 115 active flexion) 4-6wks           PT Goal 2  " "     Start:  06/27/24    Expected End:  09/25/24       1. \"I want my  knee  to feel better\".             "

## 2024-08-14 ENCOUNTER — TREATMENT (OUTPATIENT)
Dept: PHYSICAL THERAPY | Facility: CLINIC | Age: 24
End: 2024-08-14
Payer: COMMERCIAL

## 2024-08-14 DIAGNOSIS — M25.562 KNEE PAIN, LEFT: ICD-10-CM

## 2024-08-14 PROCEDURE — 97110 THERAPEUTIC EXERCISES: CPT | Mod: GP

## 2024-08-14 ASSESSMENT — PAIN - FUNCTIONAL ASSESSMENT: PAIN_FUNCTIONAL_ASSESSMENT: 0-10

## 2024-08-14 ASSESSMENT — PAIN SCALES - GENERAL: PAINLEVEL_OUTOF10: 0 - NO PAIN

## 2024-08-14 NOTE — PROGRESS NOTES
"Physical Therapy Treatment    Patient Name: Buzz Chatman  MRN: [id number]  Today's Date: 2024  Time Calculation  Start Time: 0838  Stop Time: 0900  Time Calculation (min): 22 min      PT Therapeutic Procedures Time Entry  Therapeutic Exercise Time Entry: 22                         General:  General  Reason for Referral: knee pain L  Referred By: Torrie  General Comment:       Current Problem  Problem List Items Addressed This Visit             ICD-10-CM    Knee pain, left M25.562         Assessment:Patient identity confirmed today with name/. ;  ( 0 ) falls since last clinic visit; see goals      Plan:  This patient agrees to discharge to ongoing HEP and home management with ( excellent ) progress achieved.       Subjective: I have  0 /10 pain right now.         Precautions  Precautions  Braces Applied: L knee soft LB; x2 crutches  Precautions Comment: mod fall risk; WBAT      Pain  Pain Assessment: 0-10  0-10 (Numeric) Pain Score: 0 - No pain  Pain Location: Knee  Pain Orientation: Left    Objective  All goals MET  Manual:__0___min    There ex:___22__min  Goals reviewed, surveyed and/or updated  Reviewed ongoing HEP today.  NOT TODAY  Recumbent bike x6' X  SciFit x6'   Nu-step 5' Lv 4 X  Heel slide with strap 10 x 3 count hold X  Seated HSC 2x10 (manual today)   Seated LAQ 10 x 3 count hold X  Standing TKE 2x10 purple Tband L X  Partial squats: 2x10    BHR 2x10  Slantboard x1'   Step ups 6\" step 2x10 Fwd X  X10 Lateral L leading   Fwd step-ups 2x10 12\"   heel taps 2x10 6\"  SLS L 2x20\" on airex (knee unlocked)  Leg Press: B 120#, 2x10 X   L 2x10 115# 2x10  Standing hip abduction 2x10 Bilat X  Standing hip extension 2x10 Bilat  X  Standing hip flexion 2x10 Bilat X         Not Today:  Seated HS stretch 3x30\" L   SLR x10 L     OP EDUCATION:    Access Code: [email or web address]  URL: [email or web address]  Date: 07/15/2024  Prepared by: Lillian Santizo     Exercises  - Supine Active Straight Leg " Raise  - 1 x daily - 7 x weekly - 3 sets - 10 reps  - Seated Hamstring Stretch  - 1 x daily - 7 x weekly - 3 reps - 30 hold  - Seated Long Arc Quad  - 1 x daily - 7 x weekly - 3 sets - 10 reps  - Standing Hip Abduction with Counter Support  - 1 x daily - 7 x weekly - 3 sets - 10 reps  - Standing Hip Extension with Counter Support  - 1 x daily - 7 x weekly - 3 sets - 10 reps  - Step Up  - 1 x daily - 7 x weekly - 3 sets - 10 reps  - Standing Hip Flexion AROM  - 1 x daily - 7 x weekly - 3 sets - 10 reps       Access Code: [email or web address]  URL: [email or web address]  Date: 07/31/2024  Prepared by: Matthias Little     Exercises  - Sitting Heel Slide with Towel (Mirrored)   - Seated Long Arc Quad (Mirrored)   - Seated Hamstring Curl with Anchored Resistance (Mirrored)   - Standing Terminal Knee Extension with Resistance (Mirrored)   - Step Up   - Lateral Step Ups (Mirrored)   - Lateral Step Down   - Standing Single Leg Stance with Counter Support (Mirrored)   - Heel Raise   - Standing Gastroc Stretch on Step with Counter Support     OP EDUCATION:  Access Code: [email or web address]  URL: [email or web address]  Date: 08/14/2024  Prepared by: Matthias Little    Exercises  - Step Up   - Lateral Step Ups (Mirrored)   - Lateral Step Down   - Standing Single Leg Stance with Counter Support (Mirrored)     Goals:  Active       PT Problem       PT Goal 1       Start:  06/27/24    Expected End:  09/25/24       1. Independent HEP to allow for 50% reduction in max ADL C/C sx ( 7/10) 2-3wks 2/7 max sx within the last 5 days; 8/14/24; MET  2. 0/10 night time sx to allow for uninterrupted sleep x 1wk ( 7/7) 2-3wks 0/7 as of 8/14/24; MET  3. Survey score improvement from 26/80 to 40/80 (LEFS) 4-6wks 75/80 as of 8/14/24; MET  4. Strength increase to allow for improved ADL gait on flats/stairs (from 3+/5 to 4+/5 L knee) 4-6wks notes gait improvements; 4+/5 L knee flex/ext); 8/14/24; MET  5. ROM increase to allow  "for improved ADL dressing lowers (from 35 to 115 active flexion) 4-6wks notes ADL improvements; L knee flexion to 130 today; 8/14/24; MET           PT Goal 2       Start:  06/27/24    Expected End:  09/25/24       1. \"I want my  knee  to feel better\". \"My knee fleels much better\".; 8/14/24; MET            "

## 2024-09-05 ENCOUNTER — APPOINTMENT (OUTPATIENT)
Dept: PRIMARY CARE | Facility: CLINIC | Age: 24
End: 2024-09-05
Payer: COMMERCIAL

## 2024-09-25 ENCOUNTER — LAB (OUTPATIENT)
Facility: LAB | Age: 24
End: 2024-09-25
Payer: COMMERCIAL

## 2024-09-25 DIAGNOSIS — Z79.899 OTHER LONG TERM (CURRENT) DRUG THERAPY: Primary | ICD-10-CM

## 2024-09-25 LAB
ALBUMIN SERPL BCP-MCNC: 3.9 G/DL (ref 3.4–5)
ALP SERPL-CCNC: 68 U/L (ref 33–120)
ALT SERPL W P-5'-P-CCNC: 16 U/L (ref 10–52)
ANION GAP SERPL CALC-SCNC: 12 MMOL/L (ref 10–20)
AST SERPL W P-5'-P-CCNC: 13 U/L (ref 9–39)
BASOPHILS # BLD AUTO: 0.01 X10*3/UL (ref 0–0.1)
BASOPHILS NFR BLD AUTO: 0.3 %
BILIRUB SERPL-MCNC: 0.5 MG/DL (ref 0–1.2)
BUN SERPL-MCNC: 10 MG/DL (ref 6–23)
CALCIUM SERPL-MCNC: 8.5 MG/DL (ref 8.6–10.3)
CHLORIDE SERPL-SCNC: 106 MMOL/L (ref 98–107)
CO2 SERPL-SCNC: 28 MMOL/L (ref 21–32)
CREAT SERPL-MCNC: 0.75 MG/DL (ref 0.5–1.3)
EGFRCR SERPLBLD CKD-EPI 2021: >90 ML/MIN/1.73M*2
EOSINOPHIL # BLD AUTO: 0.16 X10*3/UL (ref 0–0.7)
EOSINOPHIL NFR BLD AUTO: 4.3 %
ERYTHROCYTE [DISTWIDTH] IN BLOOD BY AUTOMATED COUNT: 12.4 % (ref 11.5–14.5)
GLUCOSE SERPL-MCNC: 187 MG/DL (ref 74–99)
HCT VFR BLD AUTO: 43.3 % (ref 41–52)
HGB BLD-MCNC: 14.5 G/DL (ref 13.5–17.5)
IMM GRANULOCYTES # BLD AUTO: 0.04 X10*3/UL (ref 0–0.7)
IMM GRANULOCYTES NFR BLD AUTO: 1.1 % (ref 0–0.9)
LYMPHOCYTES # BLD AUTO: 1.52 X10*3/UL (ref 1.2–4.8)
LYMPHOCYTES NFR BLD AUTO: 40.4 %
MCH RBC QN AUTO: 29.6 PG (ref 26–34)
MCHC RBC AUTO-ENTMCNC: 33.5 G/DL (ref 32–36)
MCV RBC AUTO: 88 FL (ref 80–100)
MONOCYTES # BLD AUTO: 0.26 X10*3/UL (ref 0.1–1)
MONOCYTES NFR BLD AUTO: 6.9 %
NEUTROPHILS # BLD AUTO: 1.77 X10*3/UL (ref 1.2–7.7)
NEUTROPHILS NFR BLD AUTO: 47 %
NRBC BLD-RTO: 0 /100 WBCS (ref 0–0)
PLATELET # BLD AUTO: 206 X10*3/UL (ref 150–450)
POTASSIUM SERPL-SCNC: 3.9 MMOL/L (ref 3.5–5.3)
PROT SERPL-MCNC: 6.6 G/DL (ref 6.4–8.2)
RBC # BLD AUTO: 4.9 X10*6/UL (ref 4.5–5.9)
SODIUM SERPL-SCNC: 142 MMOL/L (ref 136–145)
VALPROATE SERPL-MCNC: 38 UG/ML (ref 50–100)
WBC # BLD AUTO: 3.8 X10*3/UL (ref 4.4–11.3)

## 2024-09-25 PROCEDURE — 80164 ASSAY DIPROPYLACETIC ACD TOT: CPT

## 2024-09-25 PROCEDURE — 80053 COMPREHEN METABOLIC PANEL: CPT

## 2024-09-25 PROCEDURE — 85025 COMPLETE CBC W/AUTO DIFF WBC: CPT

## 2024-12-28 ENCOUNTER — LAB (OUTPATIENT)
Dept: LAB | Facility: LAB | Age: 24
End: 2024-12-28
Payer: COMMERCIAL

## 2024-12-28 DIAGNOSIS — Z79.899 OTHER LONG TERM (CURRENT) DRUG THERAPY: Primary | ICD-10-CM

## 2024-12-28 LAB
ALBUMIN SERPL BCP-MCNC: 4.1 G/DL (ref 3.4–5)
ALP SERPL-CCNC: 66 U/L (ref 33–120)
ALT SERPL W P-5'-P-CCNC: 16 U/L (ref 10–52)
ANION GAP SERPL CALC-SCNC: 12 MMOL/L (ref 10–20)
AST SERPL W P-5'-P-CCNC: 12 U/L (ref 9–39)
BASOPHILS # BLD AUTO: 0.02 X10*3/UL (ref 0–0.1)
BASOPHILS NFR BLD AUTO: 0.4 %
BILIRUB SERPL-MCNC: 0.4 MG/DL (ref 0–1.2)
BUN SERPL-MCNC: 16 MG/DL (ref 6–23)
CALCIUM SERPL-MCNC: 8.7 MG/DL (ref 8.6–10.3)
CHLORIDE SERPL-SCNC: 103 MMOL/L (ref 98–107)
CO2 SERPL-SCNC: 28 MMOL/L (ref 21–32)
CREAT SERPL-MCNC: 0.82 MG/DL (ref 0.5–1.3)
EGFRCR SERPLBLD CKD-EPI 2021: >90 ML/MIN/1.73M*2
EOSINOPHIL # BLD AUTO: 0.09 X10*3/UL (ref 0–0.7)
EOSINOPHIL NFR BLD AUTO: 1.7 %
ERYTHROCYTE [DISTWIDTH] IN BLOOD BY AUTOMATED COUNT: 13 % (ref 11.5–14.5)
GLUCOSE SERPL-MCNC: 308 MG/DL (ref 74–99)
HCT VFR BLD AUTO: 45.2 % (ref 41–52)
HGB BLD-MCNC: 15 G/DL (ref 13.5–17.5)
IMM GRANULOCYTES # BLD AUTO: 0.03 X10*3/UL (ref 0–0.7)
IMM GRANULOCYTES NFR BLD AUTO: 0.6 % (ref 0–0.9)
LYMPHOCYTES # BLD AUTO: 1.54 X10*3/UL (ref 1.2–4.8)
LYMPHOCYTES NFR BLD AUTO: 29.4 %
MCH RBC QN AUTO: 29.6 PG (ref 26–34)
MCHC RBC AUTO-ENTMCNC: 33.2 G/DL (ref 32–36)
MCV RBC AUTO: 89 FL (ref 80–100)
MONOCYTES # BLD AUTO: 0.33 X10*3/UL (ref 0.1–1)
MONOCYTES NFR BLD AUTO: 6.3 %
NEUTROPHILS # BLD AUTO: 3.23 X10*3/UL (ref 1.2–7.7)
NEUTROPHILS NFR BLD AUTO: 61.6 %
NRBC BLD-RTO: 0 /100 WBCS (ref 0–0)
PLATELET # BLD AUTO: 209 X10*3/UL (ref 150–450)
POTASSIUM SERPL-SCNC: 4.6 MMOL/L (ref 3.5–5.3)
PROT SERPL-MCNC: 6.3 G/DL (ref 6.4–8.2)
RBC # BLD AUTO: 5.07 X10*6/UL (ref 4.5–5.9)
SODIUM SERPL-SCNC: 138 MMOL/L (ref 136–145)
VALPROATE SERPL-MCNC: 42 UG/ML (ref 50–100)
WBC # BLD AUTO: 5.2 X10*3/UL (ref 4.4–11.3)

## 2024-12-28 PROCEDURE — 80053 COMPREHEN METABOLIC PANEL: CPT

## 2024-12-28 PROCEDURE — 85025 COMPLETE CBC W/AUTO DIFF WBC: CPT

## 2024-12-28 PROCEDURE — 80164 ASSAY DIPROPYLACETIC ACD TOT: CPT

## 2025-06-01 ENCOUNTER — HOSPITAL ENCOUNTER (INPATIENT)
Dept: HOSPITAL 100 - ED | Age: 25
LOS: 3 days | Discharge: HOME | DRG: 139 | End: 2025-06-04
Payer: MEDICAID

## 2025-06-01 VITALS
OXYGEN SATURATION: 97 % | HEART RATE: 140 BPM | TEMPERATURE: 98.24 F | SYSTOLIC BLOOD PRESSURE: 125 MMHG | DIASTOLIC BLOOD PRESSURE: 54 MMHG | RESPIRATION RATE: 30 BRPM

## 2025-06-01 VITALS
DIASTOLIC BLOOD PRESSURE: 90 MMHG | SYSTOLIC BLOOD PRESSURE: 130 MMHG | HEART RATE: 118 BPM | RESPIRATION RATE: 20 BRPM | OXYGEN SATURATION: 96 %

## 2025-06-01 VITALS
OXYGEN SATURATION: 99 % | TEMPERATURE: 97.88 F | RESPIRATION RATE: 16 BRPM | HEART RATE: 78 BPM | DIASTOLIC BLOOD PRESSURE: 78 MMHG | SYSTOLIC BLOOD PRESSURE: 134 MMHG

## 2025-06-01 VITALS
RESPIRATION RATE: 20 BRPM | TEMPERATURE: 99.5 F | DIASTOLIC BLOOD PRESSURE: 70 MMHG | SYSTOLIC BLOOD PRESSURE: 125 MMHG | OXYGEN SATURATION: 96 % | HEART RATE: 127 BPM

## 2025-06-01 VITALS
TEMPERATURE: 99.9 F | HEART RATE: 121 BPM | DIASTOLIC BLOOD PRESSURE: 91 MMHG | RESPIRATION RATE: 23 BRPM | OXYGEN SATURATION: 98 % | SYSTOLIC BLOOD PRESSURE: 161 MMHG

## 2025-06-01 VITALS — OXYGEN SATURATION: 96 %

## 2025-06-01 VITALS
HEART RATE: 120 BPM | SYSTOLIC BLOOD PRESSURE: 141 MMHG | RESPIRATION RATE: 20 BRPM | OXYGEN SATURATION: 96 % | DIASTOLIC BLOOD PRESSURE: 90 MMHG | TEMPERATURE: 99.68 F

## 2025-06-01 VITALS
SYSTOLIC BLOOD PRESSURE: 137 MMHG | DIASTOLIC BLOOD PRESSURE: 70 MMHG | OXYGEN SATURATION: 96 % | TEMPERATURE: 97.9 F | RESPIRATION RATE: 32 BRPM | HEART RATE: 117 BPM

## 2025-06-01 VITALS — BODY MASS INDEX: 34.9 KG/M2

## 2025-06-01 VITALS — RESPIRATION RATE: 20 BRPM | HEART RATE: 131 BPM | OXYGEN SATURATION: 99 %

## 2025-06-01 VITALS — HEART RATE: 134 BPM

## 2025-06-01 DIAGNOSIS — F32.A: ICD-10-CM

## 2025-06-01 DIAGNOSIS — E66.811: ICD-10-CM

## 2025-06-01 DIAGNOSIS — E10.65: ICD-10-CM

## 2025-06-01 DIAGNOSIS — Z96.41: ICD-10-CM

## 2025-06-01 DIAGNOSIS — J18.9: Primary | ICD-10-CM

## 2025-06-01 DIAGNOSIS — F41.9: ICD-10-CM

## 2025-06-01 DIAGNOSIS — E03.8: ICD-10-CM

## 2025-06-01 DIAGNOSIS — Z79.890: ICD-10-CM

## 2025-06-01 DIAGNOSIS — Z79.899: ICD-10-CM

## 2025-06-01 DIAGNOSIS — Z87.891: ICD-10-CM

## 2025-06-01 DIAGNOSIS — E06.3: ICD-10-CM

## 2025-06-01 LAB
ABSOLUTE NEUTROPHIL COUNT: 8.8 X10^3/UL (ref 2–7.7)
ALBUMIN SERPL-MCNC: 4.1 G/DL (ref 3.5–5)
ALBUMIN/GLOB SERPL: 1.3 RATIO (ref 0.9–2.4)
ALP SERPL-CCNC: 69 U/L (ref 40–129)
ALT SERPL-CCNC: 14 U/L (ref ?–46)
AMORPH SED URNS QL MICRO: (no result)
ANION GAP SERPL CALC-SCNC: 12 MMOL/L (ref 5–15)
ANISOCYTOSIS BLD QL SMEAR: (no result)
APTT PPP: 28.1 SECONDS (ref 24.1–36.2)
AST(SGOT): 14 U/L (ref ?–37)
BACTERIA: (no result) /HPF
BASE EXCESS BLDV CALC-SCNC: 5 MMOL/L (ref -1–3.5)
BASO STIPL BLD QL SMEAR: (no result)
BASOPHIL#: 0.02 X10^3/UL
BASOPHIL%: 0.2 % (ref 0–1)
BASOPHILS NFR SPEC MANUAL: (no result) % (ref 0–1)
BETA-HYDROXYBUTYRATE: 0.1 MMOL/L (ref 0–0.3)
BILIRUB UR QL STRIP: NEGATIVE MG/DL
BITE CELLS BLD QL SMEAR: (no result)
BLOOD UR QL: (no result) /HPF (ref 0–5)
BLOOD UR QL: NEGATIVE /UL
BUN SERPL-MCNC: 13 MG/DL (ref 4–19)
BUN/CREAT SERPL: 13.9 RATIO (ref 10–20)
BURR CELLS BLD QL SMEAR: (no result)
CALCIUM SERPL-MCNC: 9.2 MG/DL (ref 7.6–11)
CAOX CRY URNS QL MICRO: (no result) /HPF
CHLORIDE: 103 MMOL/L (ref 98–108)
CLARITY UR: CLEAR
CO2 BLDCV-SCNC: 23.8 MMOL/L (ref 21–32)
CO2 BLDV-SCNC: 32 MMOL/L (ref 23–33)
COARSE GRAN CASTS URNS QL MICRO: (no result) /LPF
COLOR UR: (no result)
CREAT SERPL-MCNC: 0.95 MG/DL (ref 0.7–1.2)
D-DIMER QUANTITATIVE (DVT/PE): < 0.27 FEU/UG/M (ref 0.27–0.49)
DEPRECATED RDW RBC: 41.2 FL (ref 35.1–43.9)
DOHLE BOD BLD QL SMEAR: (no result)
EOSINOPHIL # BLD: 0.05 X10^3/UL
ERYTHROCYTE [DISTWIDTH] IN BLOOD: 12.7 % (ref 11.6–14.6)
ESTIMATED CREATININE CLEARANCE: 175.46 ML/MIN (ref 50–250)
FREE T3: 3 PG/ML (ref 2.18–3.98)
GLOBULIN: 3.1 G/DL (ref 2.2–4.2)
GLUCOSE SERPL-MCNC: 172 MG/DL (ref 70–99)
GLUCOSE, DIPSTICK: 1000 MG/DL
HCT VFR BLD AUTO: 41 % (ref 40–54)
HGB BLD-MCNC: 13.7 G/DL (ref 13–16.5)
HOWELL-JOLLY BOD BLD QL SMEAR: (no result)
HYALINE CASTS URNS QL MICRO: (no result) /LPF (ref 0–5)
HYPOCHROMIA BLD QL: (no result)
IMM GRANULOCYTES NFR BLD AUTO: 0.6 % (ref 0–0.9)
IMMATURE GRANULOCYTES COUNT: 0.07 X10^3/UL (ref 0–0)
INTERNATIONAL NORMALIZED RATIO: 0.9
KETONE-DIPSTICK: NEGATIVE MG/DL
LACTIC ACID: 1.5 MMOL/L (ref 0–2)
LEUKOCYTE ESTERASE UR QL STRIP: NEGATIVE /UL
LIPASE: 16 U/L (ref 13–75)
LYMPHOCYTES # SPEC AUTO: 1.1 X10^3/UL (ref 0.83–4.51)
LYMPHOCYTES # SPEC AUTO: 1.1 X10^3/UL (ref 0.83–4.51)
LYMPHOCYTES NFR SPEC AUTO: 10.1 % (ref 19–41)
Lab: (no result)
Lab: 0.5 % (ref 0–5)
Lab: 30 MMOL/L (ref 22–26)
MACROCYTES BLD QL: (no result)
MANUAL DIF COMMENT BLD-IMP: (no result)
MCH RBC QN AUTO: 29.5 PG (ref 27–32)
MCV RBC: 88.2 FL (ref 80–94)
MEAN CORP HGB CONC: 33.4 G/DL (ref 32–36)
MEAN PLATELET VOL.: 9.4 FL (ref 6.2–12)
MONOCYTE#: 0.92 X10^3/UL
MONOCYTE%: 8.4 % (ref 0–10)
MUCOUS THREADS URNS QL MICRO: (no result) /HPF
NEUTROPHIL #: 8.75 X10^3/UL (ref 2.7–7.7)
NEUTROPHILS NFR BLD AUTO: 80.2 % (ref 47–70)
NEUTS HYPERSEG # BLD: (no result) 10*3/UL
NITRITE UR QL STRIP: NEGATIVE
NRBC FLAGGED BY ANALYZER: 0 % (ref 0–5)
O2 DELIVERY DEVICE: (no result)
PCO2 BLDV: 48.1 MMHG (ref 41–51)
PH SPEC: 7.41 [PH] (ref 7.32–7.42)
PH UR: 7 [PH] (ref 5–8)
PLATELET # BLD: 256 K/MM3 (ref 150–450)
PO2 BLDV: 27 MMHG (ref 25–40)
POLYCHROMASIA BLD QL SMEAR: (no result)
POTASSIUM SPEC-MCNC: 3.8 MMOL/L (ref 3.3–5.1)
PRO- BRAIN NATRIURETIC PEPTIDE: 248 PG/ML (ref ?–450)
PROT UR QL STRIP.AUTO: 15 MG/DL
PROTEIN, TOTAL: 7.3 G/DL (ref 5.9–8.4)
PROTHROMBIN TIME (PROTIME)PT.: 12.6 SECONDS (ref 11.7–14.9)
RBC # BLD AUTO: 4.65 M/MM3 (ref 4.6–6.2)
SITE: (no result)
SODIUM LEVEL: 139 MMOL/L (ref 133–145)
SP GR UR: 1.01 (ref 1–1.03)
SQUAMOUS URNS QL MICRO: (no result) /HPF (ref 0–5)
T4 FREE DIRECT: 1.3 NG/DL (ref 0.76–1.46)
TOTAL BILIRUBIN: 0.3 MG/DL (ref 0–1.3)
TROPONIN T HIGH SENSITIVITY: 8 NG/L (ref ?–22)
TROPONIN T SERPL HS-MCNC: 8 NG/L (ref ?–22)
TROPONIN T SERPL HS-MCNC: 8 NG/L (ref ?–22)
TSH SERPL DL<=0.005 MIU/L-ACNC: 1.06 UIU/ML (ref 0.3–4.2)
URINE PRESERVATIVE: (no result)
UROBILINOGEN UR QL STRIP.AUTO: NORMAL MG/DL
VBG SO2: 51 % (ref 50–70)
WBC # BLD AUTO: 10.9 K/MM3 (ref 4.4–11)
WBC #/AREA URNS HPF: (no result) /HPF (ref 0–5)
WBC NRBC COR # BLD: (no result) K/MM3 (ref 4.4–11)
WBC NRBC COR # BLD: (no result) K/MM3 (ref 4.4–11)

## 2025-06-01 PROCEDURE — 85379 FIBRIN DEGRADATION QUANT: CPT

## 2025-06-01 PROCEDURE — 93005 ELECTROCARDIOGRAM TRACING: CPT

## 2025-06-01 PROCEDURE — 82803 BLOOD GASES ANY COMBINATION: CPT

## 2025-06-01 PROCEDURE — 74177 CT ABD & PELVIS W/CONTRAST: CPT

## 2025-06-01 PROCEDURE — 87631 RESP VIRUS 3-5 TARGETS: CPT

## 2025-06-01 PROCEDURE — 87040 BLOOD CULTURE FOR BACTERIA: CPT

## 2025-06-01 PROCEDURE — 82010 KETONE BODYS QUAN: CPT

## 2025-06-01 PROCEDURE — 84443 ASSAY THYROID STIM HORMONE: CPT

## 2025-06-01 PROCEDURE — 87449 NOS EACH ORGANISM AG IA: CPT

## 2025-06-01 PROCEDURE — 85027 COMPLETE CBC AUTOMATED: CPT

## 2025-06-01 PROCEDURE — 80048 BASIC METABOLIC PNL TOTAL CA: CPT

## 2025-06-01 PROCEDURE — 83880 ASSAY OF NATRIURETIC PEPTIDE: CPT

## 2025-06-01 PROCEDURE — 84481 FREE ASSAY (FT-3): CPT

## 2025-06-01 PROCEDURE — 81001 URINALYSIS AUTO W/SCOPE: CPT

## 2025-06-01 PROCEDURE — 80053 COMPREHEN METABOLIC PANEL: CPT

## 2025-06-01 PROCEDURE — 87077 CULTURE AEROBIC IDENTIFY: CPT

## 2025-06-01 PROCEDURE — 83690 ASSAY OF LIPASE: CPT

## 2025-06-01 PROCEDURE — 87088 URINE BACTERIA CULTURE: CPT

## 2025-06-01 PROCEDURE — 99285 EMERGENCY DEPT VISIT HI MDM: CPT

## 2025-06-01 PROCEDURE — 85025 COMPLETE CBC W/AUTO DIFF WBC: CPT

## 2025-06-01 PROCEDURE — 83605 ASSAY OF LACTIC ACID: CPT

## 2025-06-01 PROCEDURE — A4216 STERILE WATER/SALINE, 10 ML: HCPCS

## 2025-06-01 PROCEDURE — 87086 URINE CULTURE/COLONY COUNT: CPT

## 2025-06-01 PROCEDURE — 36415 COLL VENOUS BLD VENIPUNCTURE: CPT

## 2025-06-01 PROCEDURE — 87633 RESP VIRUS 12-25 TARGETS: CPT

## 2025-06-01 PROCEDURE — 85610 PROTHROMBIN TIME: CPT

## 2025-06-01 PROCEDURE — 86698 HISTOPLASMA ANTIBODY: CPT

## 2025-06-01 PROCEDURE — 71250 CT THORAX DX C-: CPT

## 2025-06-01 PROCEDURE — 82962 GLUCOSE BLOOD TEST: CPT

## 2025-06-01 PROCEDURE — 85730 THROMBOPLASTIN TIME PARTIAL: CPT

## 2025-06-01 PROCEDURE — 94668 MNPJ CHEST WALL SBSQ: CPT

## 2025-06-01 PROCEDURE — 84484 ASSAY OF TROPONIN QUANT: CPT

## 2025-06-01 PROCEDURE — 84439 ASSAY OF FREE THYROXINE: CPT

## 2025-06-01 RX ADMIN — SODIUM CHLORIDE, POTASSIUM CHLORIDE, SODIUM LACTATE AND CALCIUM CHLORIDE 500 ML: 600; 310; 30; 20 INJECTION, SOLUTION INTRAVENOUS at 15:55

## 2025-06-01 RX ADMIN — VORTIOXETINE 10 MG: 10 TABLET, FILM COATED ORAL at 16:58

## 2025-06-01 RX ADMIN — KETOROLAC TROMETHAMINE 15 MG: 15 INJECTION, SOLUTION INTRAMUSCULAR; INTRAVENOUS at 09:14

## 2025-06-01 RX ADMIN — SODIUM CHLORIDE 999 ML: 9 INJECTION, SOLUTION INTRAVENOUS at 02:05

## 2025-06-01 RX ADMIN — SODIUM CHLORIDE 999 ML: 9 INJECTION, SOLUTION INTRAVENOUS at 04:34

## 2025-06-01 RX ADMIN — SODIUM CHLORIDE 999 ML: 9 INJECTION, SOLUTION INTRAVENOUS at 03:29

## 2025-06-01 RX ADMIN — DIVALPROEX SODIUM 500 MG: 250 TABLET, DELAYED RELEASE ORAL at 21:57

## 2025-06-01 RX ADMIN — DIVALPROEX SODIUM 500 MG: 250 TABLET, DELAYED RELEASE ORAL at 10:42

## 2025-06-01 RX ADMIN — CEFTRIAXONE SODIUM 100 GM: 2 INJECTION, POWDER, FOR SOLUTION INTRAMUSCULAR; INTRAVENOUS at 06:31

## 2025-06-01 RX ADMIN — SODIUM CHLORIDE, POTASSIUM CHLORIDE, SODIUM LACTATE AND CALCIUM CHLORIDE 999 ML: 600; 310; 30; 20 INJECTION, SOLUTION INTRAVENOUS at 10:41

## 2025-06-01 RX ADMIN — ACETAMINOPHEN 1000 MG: 500 TABLET ORAL at 04:36

## 2025-06-01 RX ADMIN — SODIUM CHLORIDE, POTASSIUM CHLORIDE, SODIUM LACTATE AND CALCIUM CHLORIDE 500 ML: 600; 310; 30; 20 INJECTION, SOLUTION INTRAVENOUS at 16:31

## 2025-06-01 RX ADMIN — AZITHROMYCIN DIHYDRATE 255 MG: 500 INJECTION, POWDER, LYOPHILIZED, FOR SOLUTION INTRAVENOUS at 07:19

## 2025-06-01 RX ADMIN — ACETAMINOPHEN 650 MG: 325 TABLET ORAL at 17:06

## 2025-06-01 RX ADMIN — BREXPIPRAZOLE 2 MG: 4 TABLET ORAL at 21:59

## 2025-06-02 VITALS
SYSTOLIC BLOOD PRESSURE: 140 MMHG | TEMPERATURE: 99.5 F | RESPIRATION RATE: 18 BRPM | DIASTOLIC BLOOD PRESSURE: 91 MMHG | HEART RATE: 121 BPM | OXYGEN SATURATION: 97 %

## 2025-06-02 VITALS
RESPIRATION RATE: 18 BRPM | HEART RATE: 115 BPM | DIASTOLIC BLOOD PRESSURE: 87 MMHG | OXYGEN SATURATION: 97 % | SYSTOLIC BLOOD PRESSURE: 130 MMHG | TEMPERATURE: 99.3 F

## 2025-06-02 VITALS
OXYGEN SATURATION: 100 % | DIASTOLIC BLOOD PRESSURE: 82 MMHG | SYSTOLIC BLOOD PRESSURE: 140 MMHG | RESPIRATION RATE: 18 BRPM | TEMPERATURE: 99.5 F | HEART RATE: 121 BPM

## 2025-06-02 VITALS
TEMPERATURE: 99.32 F | SYSTOLIC BLOOD PRESSURE: 142 MMHG | DIASTOLIC BLOOD PRESSURE: 85 MMHG | OXYGEN SATURATION: 99 % | HEART RATE: 114 BPM | RESPIRATION RATE: 16 BRPM

## 2025-06-02 VITALS
HEART RATE: 106 BPM | OXYGEN SATURATION: 96 % | TEMPERATURE: 99.86 F | DIASTOLIC BLOOD PRESSURE: 79 MMHG | SYSTOLIC BLOOD PRESSURE: 159 MMHG | RESPIRATION RATE: 16 BRPM

## 2025-06-02 VITALS — OXYGEN SATURATION: 94 %

## 2025-06-02 VITALS — HEART RATE: 115 BPM

## 2025-06-02 LAB
ANION GAP SERPL CALC-SCNC: 9 MMOL/L (ref 5–15)
ANISOCYTOSIS BLD QL SMEAR: (no result)
BASO STIPL BLD QL SMEAR: (no result)
BASOPHILS NFR SPEC MANUAL: (no result) % (ref 0–1)
BITE CELLS BLD QL SMEAR: (no result)
BUN SERPL-MCNC: 7 MG/DL (ref 4–19)
BUN/CREAT SERPL: 9.1 RATIO (ref 10–20)
BURR CELLS BLD QL SMEAR: (no result)
CALCIUM SERPL-MCNC: 9 MG/DL (ref 7.6–11)
CHLORIDE: 108 MMOL/L (ref 98–108)
CO2 BLDCV-SCNC: 23.6 MMOL/L (ref 21–32)
CREAT SERPL-MCNC: 0.77 MG/DL (ref 0.7–1.2)
DEPRECATED RDW RBC: 42.1 FL (ref 35.1–43.9)
DOHLE BOD BLD QL SMEAR: (no result)
ERYTHROCYTE [DISTWIDTH] IN BLOOD: 12.9 % (ref 11.6–14.6)
ESTIMATED CREATININE CLEARANCE: 215.89 ML/MIN (ref 50–250)
GLUCOSE BLDC GLUCOMTR-MCNC: 214 MG/DL (ref 74–106)
GLUCOSE SERPL-MCNC: 153 MG/DL (ref 70–99)
HCT VFR BLD AUTO: 38.6 % (ref 40–54)
HGB BLD-MCNC: 13 G/DL (ref 13–16.5)
HOWELL-JOLLY BOD BLD QL SMEAR: (no result)
HYPOCHROMIA BLD QL: (no result)
MACROCYTES BLD QL: (no result)
MANUAL DIF COMMENT BLD-IMP: (no result)
MCH RBC QN AUTO: 29.7 PG (ref 27–32)
MCV RBC: 88.3 FL (ref 80–94)
MEAN CORP HGB CONC: 33.7 G/DL (ref 32–36)
MEAN PLATELET VOL.: 9.3 FL (ref 6.2–12)
NEUTS HYPERSEG # BLD: (no result) 10*3/UL
PLATELET # BLD: 206 K/MM3 (ref 150–450)
POLYCHROMASIA BLD QL SMEAR: (no result)
POTASSIUM SPEC-MCNC: 3.7 MMOL/L (ref 3.3–5.1)
RBC # BLD AUTO: 4.37 M/MM3 (ref 4.6–6.2)
SODIUM LEVEL: 141 MMOL/L (ref 133–145)
WBC # BLD AUTO: 14.9 K/MM3 (ref 4.4–11)
WBC NRBC COR # BLD: (no result) K/MM3 (ref 4.4–11)

## 2025-06-02 RX ADMIN — DIVALPROEX SODIUM 500 MG: 250 TABLET, DELAYED RELEASE ORAL at 21:07

## 2025-06-02 RX ADMIN — ACETAMINOPHEN 650 MG: 325 TABLET ORAL at 15:09

## 2025-06-02 RX ADMIN — BREXPIPRAZOLE 2 MG: 4 TABLET ORAL at 21:39

## 2025-06-02 RX ADMIN — LEVOTHYROXINE SODIUM 75 MCG: 75 TABLET ORAL at 06:28

## 2025-06-02 RX ADMIN — VORTIOXETINE 10 MG: 10 TABLET, FILM COATED ORAL at 08:48

## 2025-06-02 RX ADMIN — AZITHROMYCIN DIHYDRATE 255 MG: 500 INJECTION, POWDER, LYOPHILIZED, FOR SOLUTION INTRAVENOUS at 10:52

## 2025-06-02 RX ADMIN — DIVALPROEX SODIUM 500 MG: 250 TABLET, DELAYED RELEASE ORAL at 08:48

## 2025-06-02 RX ADMIN — CEFTRIAXONE SODIUM 100 GM: 2 INJECTION, POWDER, FOR SOLUTION INTRAMUSCULAR; INTRAVENOUS at 08:47

## 2025-06-03 VITALS
SYSTOLIC BLOOD PRESSURE: 142 MMHG | RESPIRATION RATE: 16 BRPM | DIASTOLIC BLOOD PRESSURE: 82 MMHG | HEART RATE: 90 BPM | TEMPERATURE: 98.42 F | OXYGEN SATURATION: 97 %

## 2025-06-03 VITALS
DIASTOLIC BLOOD PRESSURE: 88 MMHG | RESPIRATION RATE: 18 BRPM | TEMPERATURE: 98.24 F | HEART RATE: 95 BPM | SYSTOLIC BLOOD PRESSURE: 153 MMHG | OXYGEN SATURATION: 98 %

## 2025-06-03 VITALS
TEMPERATURE: 98.1 F | HEART RATE: 102 BPM | SYSTOLIC BLOOD PRESSURE: 132 MMHG | OXYGEN SATURATION: 97 % | DIASTOLIC BLOOD PRESSURE: 76 MMHG | RESPIRATION RATE: 18 BRPM

## 2025-06-03 VITALS — HEART RATE: 95 BPM

## 2025-06-03 VITALS
HEART RATE: 98 BPM | RESPIRATION RATE: 16 BRPM | DIASTOLIC BLOOD PRESSURE: 87 MMHG | SYSTOLIC BLOOD PRESSURE: 143 MMHG | OXYGEN SATURATION: 97 % | TEMPERATURE: 99.2 F

## 2025-06-03 LAB
ABSOLUTE NEUTROPHIL COUNT: 7.2 X10^3/UL (ref 2–7.7)
ANION GAP SERPL CALC-SCNC: 14 MMOL/L (ref 5–15)
BASOPHIL#: 0.03 X10^3/UL
BASOPHIL%: 0.3 % (ref 0–1)
BUN SERPL-MCNC: 7 MG/DL (ref 4–19)
BUN/CREAT SERPL: 8.5 RATIO (ref 10–20)
CALCIUM SERPL-MCNC: 9.1 MG/DL (ref 7.6–11)
CHLORIDE: 104 MMOL/L (ref 98–108)
CO2 BLDCV-SCNC: 23.7 MMOL/L (ref 21–32)
CREAT SERPL-MCNC: 0.85 MG/DL (ref 0.7–1.2)
DEPRECATED RDW RBC: 41.2 FL (ref 35.1–43.9)
EOSINOPHIL # BLD: 0.12 X10^3/UL
ERYTHROCYTE [DISTWIDTH] IN BLOOD: 12.6 % (ref 11.6–14.6)
ESTIMATED CREATININE CLEARANCE: 195.58 ML/MIN (ref 50–250)
GLUCOSE SERPL-MCNC: 196 MG/DL (ref 70–99)
HCT VFR BLD AUTO: 40 % (ref 40–54)
HGB BLD-MCNC: 13.2 G/DL (ref 13–16.5)
IMM GRANULOCYTES NFR BLD AUTO: 0.9 % (ref 0–0.9)
IMMATURE GRANULOCYTES COUNT: 0.09 X10^3/UL (ref 0–0)
LYMPHOCYTES # SPEC AUTO: 1.81 X10^3/UL (ref 0.83–4.51)
LYMPHOCYTES # SPEC AUTO: 1.81 X10^3/UL (ref 0.83–4.51)
LYMPHOCYTES NFR SPEC AUTO: 18.4 % (ref 19–41)
Lab: 1.2 % (ref 0–5)
MCH RBC QN AUTO: 29.4 PG (ref 27–32)
MCV RBC: 89.1 FL (ref 80–94)
MEAN CORP HGB CONC: 33 G/DL (ref 32–36)
MEAN PLATELET VOL.: 9.2 FL (ref 6.2–12)
MONOCYTE#: 0.57 X10^3/UL
MONOCYTE%: 5.8 % (ref 0–10)
NEUTROPHIL #: 7.2 X10^3/UL (ref 2.7–7.7)
NEUTROPHILS NFR BLD AUTO: 73.4 % (ref 47–70)
NRBC FLAGGED BY ANALYZER: 0 % (ref 0–5)
PLATELET # BLD: 235 K/MM3 (ref 150–450)
POTASSIUM SPEC-MCNC: 3.7 MMOL/L (ref 3.3–5.1)
RBC # BLD AUTO: 4.49 M/MM3 (ref 4.6–6.2)
SODIUM LEVEL: 141 MMOL/L (ref 133–145)
WBC # BLD AUTO: 9.8 K/MM3 (ref 4.4–11)

## 2025-06-03 RX ADMIN — CEFTRIAXONE SODIUM 100 GM: 2 INJECTION, POWDER, FOR SOLUTION INTRAMUSCULAR; INTRAVENOUS at 09:20

## 2025-06-03 RX ADMIN — LEVOTHYROXINE SODIUM 75 MCG: 75 TABLET ORAL at 06:02

## 2025-06-03 RX ADMIN — AZITHROMYCIN DIHYDRATE 255 MG: 500 INJECTION, POWDER, LYOPHILIZED, FOR SOLUTION INTRAVENOUS at 10:13

## 2025-06-03 RX ADMIN — DIVALPROEX SODIUM 500 MG: 250 TABLET, DELAYED RELEASE ORAL at 09:21

## 2025-06-03 RX ADMIN — BREXPIPRAZOLE 2 MG: 4 TABLET ORAL at 21:28

## 2025-06-03 RX ADMIN — DIVALPROEX SODIUM 500 MG: 250 TABLET, DELAYED RELEASE ORAL at 21:28

## 2025-06-03 RX ADMIN — VORTIOXETINE 10 MG: 10 TABLET, FILM COATED ORAL at 09:21

## 2025-06-04 VITALS
SYSTOLIC BLOOD PRESSURE: 152 MMHG | RESPIRATION RATE: 16 BRPM | HEART RATE: 98 BPM | DIASTOLIC BLOOD PRESSURE: 90 MMHG | TEMPERATURE: 98.24 F | OXYGEN SATURATION: 97 %

## 2025-06-04 VITALS
TEMPERATURE: 98.3 F | DIASTOLIC BLOOD PRESSURE: 82 MMHG | RESPIRATION RATE: 16 BRPM | SYSTOLIC BLOOD PRESSURE: 135 MMHG | OXYGEN SATURATION: 96 % | HEART RATE: 88 BPM

## 2025-06-04 LAB
ABSOLUTE NEUTROPHIL COUNT: 3.4 X10^3/UL (ref 2–7.7)
ANION GAP SERPL CALC-SCNC: 12 MMOL/L (ref 5–15)
BASOPHIL#: 0.02 X10^3/UL
BASOPHIL%: 0.3 % (ref 0–1)
BUN SERPL-MCNC: 13 MG/DL (ref 4–19)
BUN/CREAT SERPL: 19.2 RATIO (ref 10–20)
CALCIUM SERPL-MCNC: 8.7 MG/DL (ref 7.6–11)
CHLORIDE: 104 MMOL/L (ref 98–108)
CO2 BLDCV-SCNC: 24 MMOL/L (ref 21–32)
CREAT SERPL-MCNC: 0.69 MG/DL (ref 0.7–1.2)
DEPRECATED RDW RBC: 39.7 FL (ref 35.1–43.9)
EOSINOPHIL # BLD: 0.13 X10^3/UL
ERYTHROCYTE [DISTWIDTH] IN BLOOD: 12.6 % (ref 11.6–14.6)
ESTIMATED CREATININE CLEARANCE: 240.93 ML/MIN (ref 50–250)
GLUCOSE SERPL-MCNC: 165 MG/DL (ref 70–99)
HCT VFR BLD AUTO: 39.3 % (ref 40–54)
HGB BLD-MCNC: 13.5 G/DL (ref 13–16.5)
IMM GRANULOCYTES NFR BLD AUTO: 1.3 % (ref 0–0.9)
IMMATURE GRANULOCYTES COUNT: 0.08 X10^3/UL (ref 0–0)
LYMPHOCYTES # SPEC AUTO: 2.16 X10^3/UL (ref 0.83–4.51)
LYMPHOCYTES # SPEC AUTO: 2.16 X10^3/UL (ref 0.83–4.51)
LYMPHOCYTES NFR SPEC AUTO: 35.2 % (ref 19–41)
Lab: 2.1 % (ref 0–5)
MCH RBC QN AUTO: 29.7 PG (ref 27–32)
MCV RBC: 86.4 FL (ref 80–94)
MEAN CORP HGB CONC: 34.4 G/DL (ref 32–36)
MEAN PLATELET VOL.: 9.3 FL (ref 6.2–12)
MONOCYTE#: 0.36 X10^3/UL
MONOCYTE%: 5.9 % (ref 0–10)
NEUTROPHIL #: 3.39 X10^3/UL (ref 2.7–7.7)
NEUTROPHILS NFR BLD AUTO: 55.2 % (ref 47–70)
NRBC FLAGGED BY ANALYZER: 0 % (ref 0–5)
PLATELET # BLD: 241 K/MM3 (ref 150–450)
POTASSIUM SPEC-MCNC: 3.7 MMOL/L (ref 3.3–5.1)
RBC # BLD AUTO: 4.55 M/MM3 (ref 4.6–6.2)
SODIUM LEVEL: 140 MMOL/L (ref 133–145)
WBC # BLD AUTO: 6.1 K/MM3 (ref 4.4–11)

## 2025-06-04 RX ADMIN — LEVOTHYROXINE SODIUM 75 MCG: 75 TABLET ORAL at 06:05

## 2025-06-04 RX ADMIN — CEFTRIAXONE SODIUM 100 GM: 2 INJECTION, POWDER, FOR SOLUTION INTRAMUSCULAR; INTRAVENOUS at 09:00

## 2025-06-04 RX ADMIN — DIVALPROEX SODIUM 500 MG: 250 TABLET, DELAYED RELEASE ORAL at 09:01

## 2025-06-04 RX ADMIN — AZITHROMYCIN DIHYDRATE 255 MG: 500 INJECTION, POWDER, LYOPHILIZED, FOR SOLUTION INTRAVENOUS at 10:03

## 2025-06-04 RX ADMIN — VORTIOXETINE 10 MG: 10 TABLET, FILM COATED ORAL at 09:02

## 2025-06-05 LAB — H CAPSUL AB TITR SER ID: NEGATIVE {TITER}

## 2025-08-26 ENCOUNTER — APPOINTMENT (OUTPATIENT)
Age: 25
End: 2025-08-26
Payer: COMMERCIAL

## 2025-08-26 VITALS
HEART RATE: 108 BPM | OXYGEN SATURATION: 98 % | SYSTOLIC BLOOD PRESSURE: 138 MMHG | HEIGHT: 76 IN | BODY MASS INDEX: 34.16 KG/M2 | DIASTOLIC BLOOD PRESSURE: 84 MMHG | WEIGHT: 280.5 LBS

## 2025-08-26 DIAGNOSIS — L91.8 ACROCHORDON: Primary | ICD-10-CM

## 2025-08-26 DIAGNOSIS — E10.69 TYPE 1 DIABETES MELLITUS WITH OTHER SPECIFIED COMPLICATION: ICD-10-CM

## 2025-08-26 PROCEDURE — 3079F DIAST BP 80-89 MM HG: CPT | Performed by: INTERNAL MEDICINE

## 2025-08-26 PROCEDURE — 3008F BODY MASS INDEX DOCD: CPT | Performed by: INTERNAL MEDICINE

## 2025-08-26 PROCEDURE — 99213 OFFICE O/P EST LOW 20 MIN: CPT | Performed by: INTERNAL MEDICINE

## 2025-08-26 PROCEDURE — 3075F SYST BP GE 130 - 139MM HG: CPT | Performed by: INTERNAL MEDICINE

## 2025-08-26 ASSESSMENT — ENCOUNTER SYMPTOMS
DIARRHEA: 0
BLOOD IN STOOL: 0
FATIGUE: 0
ABDOMINAL PAIN: 0
PALPITATIONS: 0
NAUSEA: 0
COUGH: 0
WHEEZING: 0
VOMITING: 0
SHORTNESS OF BREATH: 0

## 2025-08-26 ASSESSMENT — PATIENT HEALTH QUESTIONNAIRE - PHQ9
SUM OF ALL RESPONSES TO PHQ9 QUESTIONS 1 AND 2: 0
2. FEELING DOWN, DEPRESSED OR HOPELESS: NOT AT ALL
1. LITTLE INTEREST OR PLEASURE IN DOING THINGS: NOT AT ALL